# Patient Record
Sex: FEMALE | Race: WHITE | NOT HISPANIC OR LATINO | Employment: UNEMPLOYED | URBAN - METROPOLITAN AREA
[De-identification: names, ages, dates, MRNs, and addresses within clinical notes are randomized per-mention and may not be internally consistent; named-entity substitution may affect disease eponyms.]

---

## 2018-01-27 LAB
COLOR STL: ABNORMAL
CONSISTENCY STL: ABNORMAL
HEMOCCULT SP1 STL QL: POSITIVE

## 2018-01-28 LAB
COLOR STL: ABNORMAL
CONSISTENCY STL: ABNORMAL
HEMOCCULT SP2 STL QL: POSITIVE

## 2018-01-30 ENCOUNTER — HISTORICAL (OUTPATIENT)
Dept: INTERNAL MEDICINE | Facility: CLINIC | Age: 58
End: 2018-01-30

## 2018-01-30 LAB
COLOR STL: NORMAL
CONSISTENCY STL: NORMAL

## 2018-02-01 ENCOUNTER — HISTORICAL (OUTPATIENT)
Dept: INTERNAL MEDICINE | Facility: CLINIC | Age: 58
End: 2018-02-01

## 2018-02-01 LAB
ABS NEUT (OLG): 3.72 X10(3)/MCL (ref 2.1–9.2)
ALBUMIN SERPL-MCNC: 4.1 GM/DL (ref 3.4–5)
ALBUMIN/GLOB SERPL: 1 RATIO (ref 1–2)
ALP SERPL-CCNC: 92 UNIT/L (ref 45–117)
ALT SERPL-CCNC: 19 UNIT/L (ref 12–78)
APPEARANCE, UA: ABNORMAL
AST SERPL-CCNC: 12 UNIT/L (ref 15–37)
BACTERIA #/AREA URNS AUTO: ABNORMAL /[HPF]
BASOPHILS # BLD AUTO: 0.08 X10(3)/MCL
BASOPHILS NFR BLD AUTO: 1 % (ref 0–1)
BILIRUB SERPL-MCNC: 0.9 MG/DL (ref 0.2–1)
BILIRUB UR QL STRIP: NEGATIVE
BILIRUBIN DIRECT+TOT PNL SERPL-MCNC: 0.2 MG/DL
BILIRUBIN DIRECT+TOT PNL SERPL-MCNC: 0.7 MG/DL
BUN SERPL-MCNC: 16 MG/DL (ref 7–18)
CALCIUM SERPL-MCNC: 9.3 MG/DL (ref 8.5–10.1)
CHLORIDE SERPL-SCNC: 107 MMOL/L (ref 98–107)
CHOLEST SERPL-MCNC: 151 MG/DL
CHOLEST/HDLC SERPL: 2.7 {RATIO} (ref 0–4.4)
CO2 SERPL-SCNC: 25 MMOL/L (ref 21–32)
COLOR UR: YELLOW
CREAT SERPL-MCNC: 0.8 MG/DL (ref 0.6–1.3)
DEPRECATED CALCIDIOL+CALCIFEROL SERPL-MC: 18.67 NG/ML (ref 30–80)
EOSINOPHIL # BLD AUTO: 0.12 10*3/UL
EOSINOPHIL NFR BLD AUTO: 2 % (ref 0–5)
ERYTHROCYTE [DISTWIDTH] IN BLOOD BY AUTOMATED COUNT: 12.4 % (ref 11.5–14.5)
EST. AVERAGE GLUCOSE BLD GHB EST-MCNC: 103 MG/DL
GLOBULIN SER-MCNC: 3.6 GM/ML (ref 2.3–3.5)
GLUCOSE (UA): NORMAL
GLUCOSE SERPL-MCNC: 89 MG/DL (ref 74–106)
HAV IGM SERPL QL IA: NONREACTIVE
HBA1C MFR BLD: 5.2 % (ref 4.2–6.3)
HBV CORE IGM SERPL QL IA: NONREACTIVE
HBV SURFACE AG SERPL QL IA: NEGATIVE
HCT VFR BLD AUTO: 45.4 % (ref 35–46)
HCV AB SERPL QL IA: REACTIVE
HDLC SERPL-MCNC: 55 MG/DL
HGB BLD-MCNC: 16.3 GM/DL (ref 12–16)
HGB UR QL STRIP: 0.06 MG/DL
HIV 1+2 AB+HIV1 P24 AG SERPL QL IA: NONREACTIVE
HYALINE CASTS #/AREA URNS LPF: ABNORMAL /[LPF]
IMM GRANULOCYTES # BLD AUTO: 0.01 10*3/UL
IMM GRANULOCYTES NFR BLD AUTO: 0 %
KETONES UR QL STRIP: NEGATIVE
LDLC SERPL CALC-MCNC: 74 MG/DL (ref 0–130)
LEUKOCYTE ESTERASE UR QL STRIP: 250 LEU/UL
LYMPHOCYTES # BLD AUTO: 1.79 X10(3)/MCL
LYMPHOCYTES NFR BLD AUTO: 29 % (ref 15–40)
MCH RBC QN AUTO: 32.3 PG (ref 26–34)
MCHC RBC AUTO-ENTMCNC: 35.9 GM/DL (ref 31–37)
MCV RBC AUTO: 89.9 FL (ref 80–100)
MONOCYTES # BLD AUTO: 0.49 X10(3)/MCL
MONOCYTES NFR BLD AUTO: 8 % (ref 4–12)
NEUTROPHILS # BLD AUTO: 3.72 X10(3)/MCL
NEUTROPHILS NFR BLD AUTO: 60 X10(3)/MCL
NITRITE UR QL STRIP: NEGATIVE
PH UR STRIP: 5.5 [PH] (ref 4.5–8)
PLATELET # BLD AUTO: 184 X10(3)/MCL (ref 130–400)
PMV BLD AUTO: 12.6 FL (ref 7.4–10.4)
POTASSIUM SERPL-SCNC: 3.9 MMOL/L (ref 3.5–5.1)
PROT SERPL-MCNC: 7.7 GM/DL (ref 6.4–8.2)
PROT UR QL STRIP: 20 MG/DL
RBC # BLD AUTO: 5.05 X10(6)/MCL (ref 4–5.2)
RBC #/AREA URNS AUTO: ABNORMAL /[HPF]
SODIUM SERPL-SCNC: 141 MMOL/L (ref 136–145)
SP GR UR STRIP: 1.02 (ref 1–1.03)
SQUAMOUS #/AREA URNS LPF: >100 /[LPF]
TRIGL SERPL-MCNC: 112 MG/DL
TSH SERPL-ACNC: 1.5 MIU/L (ref 0.36–3.74)
UROBILINOGEN UR STRIP-ACNC: NORMAL
VLDLC SERPL CALC-MCNC: 22 MG/DL
WBC # SPEC AUTO: 6.2 X10(3)/MCL (ref 4.5–11)
WBC #/AREA URNS AUTO: ABNORMAL /HPF

## 2018-03-23 ENCOUNTER — HISTORICAL (OUTPATIENT)
Dept: ENDOSCOPY | Facility: HOSPITAL | Age: 58
End: 2018-03-23

## 2018-03-23 LAB — CRC RECOMMENDATION EXT: NORMAL

## 2018-04-27 ENCOUNTER — HISTORICAL (OUTPATIENT)
Dept: INTERNAL MEDICINE | Facility: CLINIC | Age: 58
End: 2018-04-27

## 2018-04-27 LAB
ABS NEUT (OLG): 4.23 X10(3)/MCL (ref 2.1–9.2)
ALBUMIN SERPL-MCNC: 4.2 GM/DL (ref 3.4–5)
ALBUMIN/GLOB SERPL: 1 RATIO (ref 1–2)
ALP SERPL-CCNC: 98 UNIT/L (ref 45–117)
ALT SERPL-CCNC: 20 UNIT/L (ref 12–78)
APPEARANCE, UA: CLEAR
AST SERPL-CCNC: 16 UNIT/L (ref 15–37)
BACTERIA #/AREA URNS AUTO: ABNORMAL /[HPF]
BASOPHILS # BLD AUTO: 0.08 X10(3)/MCL
BASOPHILS NFR BLD AUTO: 1 %
BILIRUB SERPL-MCNC: 0.5 MG/DL (ref 0.2–1)
BILIRUB UR QL STRIP: NEGATIVE
BILIRUBIN DIRECT+TOT PNL SERPL-MCNC: 0.2 MG/DL
BILIRUBIN DIRECT+TOT PNL SERPL-MCNC: 0.3 MG/DL
BUN SERPL-MCNC: 18 MG/DL (ref 7–18)
CALCIUM SERPL-MCNC: 9.1 MG/DL (ref 8.5–10.1)
CHLORIDE SERPL-SCNC: 108 MMOL/L (ref 98–107)
CO2 SERPL-SCNC: 28 MMOL/L (ref 21–32)
COLOR UR: ABNORMAL
CREAT SERPL-MCNC: 0.8 MG/DL (ref 0.6–1.3)
DEPRECATED CALCIDIOL+CALCIFEROL SERPL-MC: 52.83 NG/ML (ref 30–80)
EOSINOPHIL # BLD AUTO: 0.19 X10(3)/MCL
EOSINOPHIL NFR BLD AUTO: 3 %
ERYTHROCYTE [DISTWIDTH] IN BLOOD BY AUTOMATED COUNT: 12.2 % (ref 11.5–14.5)
GLOBULIN SER-MCNC: 3.5 GM/ML (ref 2.3–3.5)
GLUCOSE (UA): NORMAL
GLUCOSE SERPL-MCNC: 98 MG/DL (ref 74–106)
HCT VFR BLD AUTO: 44 % (ref 35–46)
HGB BLD-MCNC: 15.1 GM/DL (ref 12–16)
HGB UR QL STRIP: NEGATIVE
HYALINE CASTS #/AREA URNS LPF: ABNORMAL /[LPF]
IMM GRANULOCYTES # BLD AUTO: 0.02 10*3/UL
IMM GRANULOCYTES NFR BLD AUTO: 0 %
KETONES UR QL STRIP: NEGATIVE
LEUKOCYTE ESTERASE UR QL STRIP: 75 LEU/UL
LYMPHOCYTES # BLD AUTO: 1.78 X10(3)/MCL
LYMPHOCYTES NFR BLD AUTO: 26 % (ref 13–40)
MCH RBC QN AUTO: 31.3 PG (ref 26–34)
MCHC RBC AUTO-ENTMCNC: 34.3 GM/DL (ref 31–37)
MCV RBC AUTO: 91.3 FL (ref 80–100)
MONOCYTES # BLD AUTO: 0.49 X10(3)/MCL
MONOCYTES NFR BLD AUTO: 7 % (ref 4–12)
NEUTROPHILS # BLD AUTO: 4.23 X10(3)/MCL
NEUTROPHILS NFR BLD AUTO: 62 X10(3)/MCL
NITRITE UR QL STRIP: NEGATIVE
PH UR STRIP: 7 [PH] (ref 4.5–8)
PLATELET # BLD AUTO: 153 X10(3)/MCL (ref 130–400)
PMV BLD AUTO: 11.6 FL (ref 7.4–10.4)
POTASSIUM SERPL-SCNC: 4.4 MMOL/L (ref 3.5–5.1)
PROT SERPL-MCNC: 7.7 GM/DL (ref 6.4–8.2)
PROT UR QL STRIP: NEGATIVE
RBC # BLD AUTO: 4.82 X10(6)/MCL (ref 4–5.2)
RBC #/AREA URNS AUTO: ABNORMAL /[HPF]
SODIUM SERPL-SCNC: 143 MMOL/L (ref 136–145)
SP GR UR STRIP: 1.01 (ref 1–1.03)
SQUAMOUS #/AREA URNS LPF: >100 /[LPF]
UROBILINOGEN UR STRIP-ACNC: NORMAL
WBC # SPEC AUTO: 6.8 X10(3)/MCL (ref 4.5–11)
WBC #/AREA URNS AUTO: ABNORMAL /HPF

## 2019-11-19 ENCOUNTER — HISTORICAL (OUTPATIENT)
Dept: RADIOLOGY | Facility: HOSPITAL | Age: 59
End: 2019-11-19

## 2020-08-06 ENCOUNTER — HISTORICAL (OUTPATIENT)
Dept: LAB | Facility: HOSPITAL | Age: 60
End: 2020-08-06

## 2020-08-06 LAB
ABS NEUT (OLG): 3.71 X10(3)/MCL (ref 2.1–9.2)
ALBUMIN SERPL-MCNC: 4 GM/DL (ref 3.4–5)
ALBUMIN/GLOB SERPL: 1.1 RATIO (ref 1.1–2)
ALP SERPL-CCNC: 93 UNIT/L (ref 45–117)
ALT SERPL-CCNC: 23 UNIT/L (ref 12–78)
APPEARANCE, UA: CLEAR
AST SERPL-CCNC: 14 UNIT/L (ref 15–37)
BACTERIA #/AREA URNS AUTO: ABNORMAL /HPF
BASOPHILS # BLD AUTO: 0.1 X10(3)/MCL (ref 0–0.2)
BASOPHILS NFR BLD AUTO: 1 %
BILIRUB SERPL-MCNC: 0.3 MG/DL (ref 0.2–1)
BILIRUB UR QL STRIP: NEGATIVE
BILIRUBIN DIRECT+TOT PNL SERPL-MCNC: 0.1 MG/DL (ref 0–0.2)
BILIRUBIN DIRECT+TOT PNL SERPL-MCNC: 0.2 MG/DL
BUN SERPL-MCNC: 18 MG/DL (ref 7–18)
CALCIUM SERPL-MCNC: 9 MG/DL (ref 8.5–10.1)
CHLORIDE SERPL-SCNC: 110 MMOL/L (ref 98–107)
CHOLEST SERPL-MCNC: 168 MG/DL
CHOLEST/HDLC SERPL: 3.8 {RATIO} (ref 0–4.4)
CO2 SERPL-SCNC: 26 MMOL/L (ref 21–32)
COLOR UR: YELLOW
CREAT SERPL-MCNC: 0.7 MG/DL (ref 0.6–1.3)
DEPRECATED CALCIDIOL+CALCIFEROL SERPL-MC: 31.9 NG/ML (ref 30–80)
EOSINOPHIL # BLD AUTO: 0.3 X10(3)/MCL (ref 0–0.9)
EOSINOPHIL NFR BLD AUTO: 5 %
ERYTHROCYTE [DISTWIDTH] IN BLOOD BY AUTOMATED COUNT: 13.5 % (ref 11.5–14.5)
EST. AVERAGE GLUCOSE BLD GHB EST-MCNC: 117 MG/DL
GLOBULIN SER-MCNC: 3.5 GM/ML (ref 2.3–3.5)
GLUCOSE (UA): NEGATIVE
GLUCOSE SERPL-MCNC: 70 MG/DL (ref 74–106)
HBA1C MFR BLD: 5.7 % (ref 4.2–6.3)
HCT VFR BLD AUTO: 41 % (ref 35–46)
HDLC SERPL-MCNC: 44 MG/DL (ref 40–59)
HGB BLD-MCNC: 13.7 GM/DL (ref 12–16)
HGB UR QL STRIP: 0.03 MG/DL
HYALINE CASTS #/AREA URNS LPF: ABNORMAL /LPF
IMM GRANULOCYTES # BLD AUTO: 0.02 10*3/UL
IMM GRANULOCYTES NFR BLD AUTO: 0 %
KETONES UR QL STRIP: NEGATIVE
LDLC SERPL CALC-MCNC: 88 MG/DL
LEUKOCYTE ESTERASE UR QL STRIP: 25 LEU/UL
LYMPHOCYTES # BLD AUTO: 1.8 X10(3)/MCL (ref 0.6–4.6)
LYMPHOCYTES NFR BLD AUTO: 28 %
MCH RBC QN AUTO: 31.1 PG (ref 26–34)
MCHC RBC AUTO-ENTMCNC: 33.4 GM/DL (ref 31–37)
MCV RBC AUTO: 93.2 FL (ref 80–100)
MONOCYTES # BLD AUTO: 0.6 X10(3)/MCL (ref 0.1–1.3)
MONOCYTES NFR BLD AUTO: 10 %
NEUTROPHILS # BLD AUTO: 3.71 X10(3)/MCL (ref 2.1–9.2)
NEUTROPHILS NFR BLD AUTO: 56 %
NITRITE UR QL STRIP: NEGATIVE
PH UR STRIP: 5 [PH] (ref 4.5–8)
PLATELET # BLD AUTO: 180 X10(3)/MCL (ref 130–400)
PMV BLD AUTO: 11.4 FL (ref 7.4–10.4)
POTASSIUM SERPL-SCNC: 4 MMOL/L (ref 3.5–5.1)
PROT SERPL-MCNC: 7.5 GM/DL (ref 6.4–8.2)
PROT UR QL STRIP: NEGATIVE
RBC # BLD AUTO: 4.4 X10(6)/MCL (ref 4–5.2)
RBC #/AREA URNS AUTO: ABNORMAL /HPF
SODIUM SERPL-SCNC: 143 MMOL/L (ref 136–145)
SP GR UR STRIP: 1.02 (ref 1–1.03)
SQUAMOUS #/AREA URNS LPF: ABNORMAL /LPF
T4 FREE SERPL-MCNC: 1.01 NG/DL (ref 0.76–1.46)
TRIGL SERPL-MCNC: 178 MG/DL
TSH SERPL-ACNC: 1.45 MIU/L (ref 0.36–3.74)
UROBILINOGEN UR STRIP-ACNC: NORMAL
VLDLC SERPL CALC-MCNC: 36 MG/DL
WBC # SPEC AUTO: 6.6 X10(3)/MCL (ref 4.5–11)
WBC #/AREA URNS AUTO: ABNORMAL /HPF

## 2022-04-12 ENCOUNTER — HISTORICAL (OUTPATIENT)
Dept: ADMINISTRATIVE | Facility: HOSPITAL | Age: 62
End: 2022-04-12
Payer: MEDICAID

## 2022-04-30 VITALS
DIASTOLIC BLOOD PRESSURE: 86 MMHG | OXYGEN SATURATION: 100 % | HEIGHT: 66 IN | SYSTOLIC BLOOD PRESSURE: 136 MMHG | WEIGHT: 146.63 LBS | BODY MASS INDEX: 23.57 KG/M2

## 2022-04-30 NOTE — OP NOTE
* Preliminary Report *    UHDS (Unverified)  DISCHARGE DATE:      PREOPERATIVE DIAGNOSIS:  Screening colonoscopy.    POSTOPERATIVE DIAGNOSIS:  Screening colonoscopy.    PROCEDURE:  Colonoscopy.    SURGEON:  Dr. Dimitrios Medrano.    COMPLICATIONS:  None.    FINDINGS:  Negative colonoscopy for polyps. Very mild sigmoid diverticulosis.    RECOMMENDATIONS:  Re-scope in 10 years or sooner if other symptoms develop.    DETAILS OF THE PROCEDURE:  After the risks and benefits of the procedure explained to the patient, informed consent was obtained. She was taken to endoscopy on 03/23/2018.  She was placed on the endoscopy table in the left lateral decubitus position. Over the course of the procedure, she was administered propofol by Anesthesiology.  I performed a rectal examination which was negative.  I then inserted the colonoscope all the way to the base of the cecum as evidenced by the appendiceal orifice and the ileocecal valve.  I centered the scope and slowly began to retract it, looking around in a 360 degree fashion.  There were no mucosal abnormalities.  No masses.  No polyps.  No AVM's.  No evidence of inflammatory bowel disease.  In the sigmoid colon, she had a couple of diverticula.  No evidence of diverticulitis.  I retroflexed in the rectum.  She does not have significant hemorrhoidal disease to speak of.  The scope was retracted.  She tolerated the procedure well, was taken to the recovery room in stable condition.    RECOMMENDATIONS:  Re-scope in 10 years or sooner if other symptoms develop.        ______________________________  MD KATHYA Shah/CHANEL  DD:  03/23/2018  Time:  09:00AM  DT:  03/23/2018  Time:  09:16AM  Job #:  [id number]       Result type: Discharge Summary  Result date: March 23, 2018 9:16 CDT  Result status: Unauth  Result title: UHDS  Performed by: Marcela CAMILO, Dimitrios CORONEL on March 23, 2018 9:00 CDT  Encounter info: [id number], [facility], Day Surgery, 3/23/2018  - 3/23/2018  Contributor system: KASIE    Doc has been moved by HIM specialist to the correct doc type.

## 2022-05-30 RX ORDER — AMLODIPINE BESYLATE 5 MG/1
5 TABLET ORAL DAILY
Qty: 30 TABLET | Refills: 1 | Status: CANCELLED | OUTPATIENT
Start: 2022-05-30

## 2022-05-30 RX ORDER — AMLODIPINE BESYLATE 5 MG/1
5 TABLET ORAL DAILY
COMMUNITY
Start: 2022-04-25 | End: 2022-06-10 | Stop reason: SDUPTHER

## 2022-05-30 NOTE — TELEPHONE ENCOUNTER
----- Message from Elena Masters sent at 5/30/2022 11:05 AM CDT -----  Regarding: Refills  Provider: DR GUZMAN  [facility]: [address]  Last Visit:  Next Visit: 6/10/2022  Patient's Contact Number:    1. Name of Medication: Amlodipine Besylate  Dosage: 5 mg tablet  Comments:    2. Name of Medication:  Dosage:  Comments:    3. Name of Medication:  Dosage:  Comments    4. Name of Medication:  Dosage:  Comments:    5. Name of Medication:  Dosage:  Comments:

## 2022-06-10 ENCOUNTER — OFFICE VISIT (OUTPATIENT)
Dept: FAMILY MEDICINE | Facility: CLINIC | Age: 62
End: 2022-06-10
Payer: MEDICAID

## 2022-06-10 VITALS
RESPIRATION RATE: 18 BRPM | OXYGEN SATURATION: 100 % | DIASTOLIC BLOOD PRESSURE: 69 MMHG | SYSTOLIC BLOOD PRESSURE: 113 MMHG | WEIGHT: 153 LBS | HEIGHT: 65 IN | BODY MASS INDEX: 25.49 KG/M2 | HEART RATE: 62 BPM | TEMPERATURE: 98 F

## 2022-06-10 DIAGNOSIS — K21.00 GASTROESOPHAGEAL REFLUX DISEASE WITH ESOPHAGITIS WITHOUT HEMORRHAGE: ICD-10-CM

## 2022-06-10 DIAGNOSIS — M25.50 ARTHRALGIA, UNSPECIFIED JOINT: ICD-10-CM

## 2022-06-10 DIAGNOSIS — R63.1 INCREASED THIRST: ICD-10-CM

## 2022-06-10 DIAGNOSIS — F33.1 MODERATE EPISODE OF RECURRENT MAJOR DEPRESSIVE DISORDER: ICD-10-CM

## 2022-06-10 DIAGNOSIS — I87.2 VENOUS INSUFFICIENCY OF BOTH LOWER EXTREMITIES: ICD-10-CM

## 2022-06-10 DIAGNOSIS — M54.16 LUMBAR RADICULOPATHY: ICD-10-CM

## 2022-06-10 DIAGNOSIS — I10 PRIMARY HYPERTENSION: Primary | ICD-10-CM

## 2022-06-10 PROBLEM — M54.30 SCIATICA: Status: RESOLVED | Noted: 2022-06-10 | Resolved: 2022-06-10

## 2022-06-10 PROBLEM — M25.562 LEFT KNEE PAIN: Status: ACTIVE | Noted: 2022-06-10

## 2022-06-10 PROBLEM — K21.9 GASTROESOPHAGEAL REFLUX DISEASE: Status: ACTIVE | Noted: 2022-06-10

## 2022-06-10 PROBLEM — E55.9 VITAMIN D DEFICIENCY: Status: ACTIVE | Noted: 2022-06-10

## 2022-06-10 PROBLEM — B02.9 HERPES ZOSTER: Status: ACTIVE | Noted: 2021-11-19

## 2022-06-10 PROBLEM — F33.3 SEVERE RECURRENT MAJOR DEPRESSIVE DISORDER WITH PSYCHOSIS: Status: ACTIVE | Noted: 2022-06-10

## 2022-06-10 PROBLEM — F41.8 MIXED ANXIETY DEPRESSIVE DISORDER: Status: ACTIVE | Noted: 2022-06-10

## 2022-06-10 PROBLEM — M54.30 SCIATICA: Status: ACTIVE | Noted: 2022-06-10

## 2022-06-10 PROBLEM — M23.91 UNSPECIFIED INTERNAL DERANGEMENT OF RIGHT KNEE: Status: ACTIVE | Noted: 2021-11-19

## 2022-06-10 PROBLEM — I49.9 IRREGULAR HEART RHYTHM: Status: ACTIVE | Noted: 2022-06-10

## 2022-06-10 PROBLEM — N39.0 URINARY TRACT INFECTION: Status: ACTIVE | Noted: 2021-11-19

## 2022-06-10 PROBLEM — Z86.79 HISTORY OF WOLFF-PARKINSON-WHITE (WPW) SYNDROME: Status: ACTIVE | Noted: 2022-06-10

## 2022-06-10 PROBLEM — L03.116 CELLULITIS OF LEFT LOWER EXTREMITY: Status: ACTIVE | Noted: 2021-11-19

## 2022-06-10 PROBLEM — M51.36 DEGENERATIVE LUMBAR DISC: Status: ACTIVE | Noted: 2022-06-10

## 2022-06-10 PROBLEM — M51.369 DEGENERATIVE LUMBAR DISC: Status: ACTIVE | Noted: 2022-06-10

## 2022-06-10 PROBLEM — S83.241A OTHER TEAR OF MEDIAL MENISCUS, CURRENT INJURY, RIGHT KNEE, INITIAL ENCOUNTER: Status: ACTIVE | Noted: 2022-06-10

## 2022-06-10 PROCEDURE — 3074F SYST BP LT 130 MM HG: CPT | Mod: CPTII,,, | Performed by: FAMILY MEDICINE

## 2022-06-10 PROCEDURE — 99214 PR OFFICE/OUTPT VISIT, EST, LEVL IV, 30-39 MIN: ICD-10-PCS | Mod: S$PBB,,, | Performed by: FAMILY MEDICINE

## 2022-06-10 PROCEDURE — 3008F PR BODY MASS INDEX (BMI) DOCUMENTED: ICD-10-PCS | Mod: CPTII,,, | Performed by: FAMILY MEDICINE

## 2022-06-10 PROCEDURE — 3074F PR MOST RECENT SYSTOLIC BLOOD PRESSURE < 130 MM HG: ICD-10-PCS | Mod: CPTII,,, | Performed by: FAMILY MEDICINE

## 2022-06-10 PROCEDURE — 99214 OFFICE O/P EST MOD 30 MIN: CPT | Mod: S$PBB,,, | Performed by: FAMILY MEDICINE

## 2022-06-10 PROCEDURE — 3078F PR MOST RECENT DIASTOLIC BLOOD PRESSURE < 80 MM HG: ICD-10-PCS | Mod: CPTII,,, | Performed by: FAMILY MEDICINE

## 2022-06-10 PROCEDURE — 3078F DIAST BP <80 MM HG: CPT | Mod: CPTII,,, | Performed by: FAMILY MEDICINE

## 2022-06-10 PROCEDURE — 1159F MED LIST DOCD IN RCRD: CPT | Mod: CPTII,,, | Performed by: FAMILY MEDICINE

## 2022-06-10 PROCEDURE — 3008F BODY MASS INDEX DOCD: CPT | Mod: CPTII,,, | Performed by: FAMILY MEDICINE

## 2022-06-10 PROCEDURE — 1160F RVW MEDS BY RX/DR IN RCRD: CPT | Mod: CPTII,,, | Performed by: FAMILY MEDICINE

## 2022-06-10 PROCEDURE — 99215 OFFICE O/P EST HI 40 MIN: CPT | Mod: PBBFAC | Performed by: FAMILY MEDICINE

## 2022-06-10 PROCEDURE — 1160F PR REVIEW ALL MEDS BY PRESCRIBER/CLIN PHARMACIST DOCUMENTED: ICD-10-PCS | Mod: CPTII,,, | Performed by: FAMILY MEDICINE

## 2022-06-10 PROCEDURE — 1159F PR MEDICATION LIST DOCUMENTED IN MEDICAL RECORD: ICD-10-PCS | Mod: CPTII,,, | Performed by: FAMILY MEDICINE

## 2022-06-10 RX ORDER — MELOXICAM 15 MG/1
1 TABLET ORAL DAILY
COMMUNITY
Start: 2021-11-19 | End: 2022-06-10 | Stop reason: SDUPTHER

## 2022-06-10 RX ORDER — OMEPRAZOLE 40 MG/1
40 CAPSULE, DELAYED RELEASE ORAL DAILY
Qty: 30 CAPSULE | Refills: 5 | Status: SHIPPED | OUTPATIENT
Start: 2022-06-10 | End: 2023-02-10

## 2022-06-10 RX ORDER — AMLODIPINE BESYLATE 5 MG/1
5 TABLET ORAL DAILY
Qty: 30 TABLET | Refills: 5 | Status: SHIPPED | OUTPATIENT
Start: 2022-06-10 | End: 2023-02-10 | Stop reason: SDUPTHER

## 2022-06-10 RX ORDER — ERGOCALCIFEROL 1.25 MG/1
50000 CAPSULE ORAL
COMMUNITY
Start: 2022-04-22 | End: 2022-06-10 | Stop reason: SDUPTHER

## 2022-06-10 RX ORDER — ESOMEPRAZOLE MAGNESIUM 40 MG/1
1 CAPSULE, DELAYED RELEASE ORAL DAILY
COMMUNITY
End: 2022-06-10

## 2022-06-10 RX ORDER — DULOXETIN HYDROCHLORIDE 30 MG/1
30 CAPSULE, DELAYED RELEASE ORAL DAILY
Qty: 30 CAPSULE | Refills: 1 | Status: SHIPPED | OUTPATIENT
Start: 2022-06-10 | End: 2023-02-10

## 2022-06-10 RX ORDER — OMEPRAZOLE 40 MG/1
40 CAPSULE, DELAYED RELEASE ORAL DAILY
COMMUNITY
Start: 2022-03-24 | End: 2022-06-10 | Stop reason: SDUPTHER

## 2022-06-10 RX ORDER — MELOXICAM 15 MG/1
15 TABLET ORAL DAILY
Qty: 30 TABLET | Refills: 1 | Status: SHIPPED | OUTPATIENT
Start: 2022-06-10 | End: 2022-07-10

## 2022-06-10 RX ORDER — AMLODIPINE BESYLATE 5 MG/1
5 TABLET ORAL DAILY
COMMUNITY
Start: 2021-08-17 | End: 2022-06-10 | Stop reason: SDUPTHER

## 2022-06-10 RX ORDER — ERGOCALCIFEROL 1.25 MG/1
50000 CAPSULE ORAL
Qty: 12 CAPSULE | Refills: 0 | Status: SHIPPED | OUTPATIENT
Start: 2022-06-10 | End: 2022-09-08

## 2022-06-10 NOTE — PROGRESS NOTES
[facility] - [facility]  [address] [address]  Phone: [phone]   Subjective:      Patient ID: Ching Rojas is a 61 y.o. female with a past medical history of hypertension, GERD, vitamin D deficiency, Wolfman Parkinson White syndrome/irregular heartbeat/palpitations, lumbar DDD with radiculopathy, major depression, and anxiety. FHx of BP disorder.  Patient states that she is adhering to a low sodium and low fat eating habit.     Pt lost to follow up since 8/12/2020 (last visit).    Chief Complaint: Leg Pain (Ankles and feet GEORGE ) and Hypertension    Problem List Items Addressed This Visit        Cardiac/Vascular    Venous insufficiency of both lower extremities    Overview     Pt with venous insufficiency of b/l legs with engorged vessels in the legs L>R. Pt c/o peripheral edema and pain in b/l legs from swelling. Pt states she has seen [facility] in the past for sclerotherapy.           Relevant Orders    CV US Lower Extremity Veins Bilateral Insufficiency    Ambulatory referral/consult to Interventional Cardiology    Hypertension - Primary    Overview     The patient presents with essential hypertension.  The patient is tolerating the medication well and is in excellent compliance.  The patient is experiencing no side effects.  Counseling was offered regarding low salt diets.  The patient has a reduced salt intake.  The patient denies chest pain, palpitations, shortness of breath, dyspnea on exertion, left or murmur neck pain, nausea, vomiting, diaphoresis, paroxysmal nocturnal dyspnea, and orthopnea.   Hypertension Medications             amLODIPine (NORVASC) 5 MG tablet Take 5 mg by mouth once daily.            Relevant Orders    CBC Auto Differential    Comprehensive Metabolic Panel    TSH    Lipid Panel       GI    Gastroesophageal reflux disease      Other Visit Diagnoses     Increased thirst        Relevant Orders    Hemoglobin A1C    Arthralgia,  unspecified joint      Localized to the ankles and knees. Pt states she has a history of gout. Pt states joints are tender with swelling intermittently. Pt denies trauma.    Relevant Orders    Vitamin D    Uric Acid    ANTINUCLEAR ANTIBODIES COMPREHENSIVE PANEL          The patient's Health Maintenance was reviewed and the following appears to be due:   Health Maintenance Due   Topic Date Due    Hepatitis C Screening  Never done    Cervical Cancer Screening  Never done    COVID-19 Vaccine (1) Never done    Colorectal Cancer Screening  Never done    Shingles Vaccine (1 of 2) Never done    Mammogram  03/06/2016       (PHQ-2 data if performed)  Depression Patient Health Questionnaire 6/10/2022   Over the last two weeks how often have you been bothered by little interest or pleasure in doing things 0   Over the last two weeks how often have you been bothered by feeling down, depressed or hopeless 0   PHQ-2 Total Score 0     (PHQ-9 data if performed)  No flowsheet data found.  (JENNIFER data if performed)  GAD7 6/10/2022   1. Feeling nervous, anxious, or on edge? 1   2. Not being able to stop or control worrying? 3   3. Worrying too much about different things? 3   4. Trouble relaxing? 1   5. Being so restless that it is hard to sit still? 2   6. Becoming easily annoyed or irritable? 2   7. Feeling afraid as if something awful might happen? 2   8. If you checked off any problems, how difficult have these problems made it for you to do your work, take care of things at home, or get along with other people? 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      [DISCONTINUED] amLODIPine (NORVASC) 5 MG tablet Take 5 mg by mouth once daily.      [DISCONTINUED] esomeprazole (NEXIUM) 40 MG capsule Take 1 capsule by mouth once daily.      [DISCONTINUED] interferon trina-2b,recomb. (INTERFERON TRINA-2B INJ) interferon trina-2b Take No date recorded No form recorded No frequency recorded No route recorded No set duration recorded No set duration amount recorded suspended No dosage strength recorded No dosage strength units of measure recorded      [DISCONTINUED] meloxicam (MOBIC) 15 MG tablet Take 1 tablet by mouth once daily.      [DISCONTINUED] omeprazole (PRILOSEC) 40 MG capsule Take 40 mg by mouth once daily.      [DISCONTINUED] VITAMIN D2 1,250 mcg (50,000 unit) capsule Take 50,000 Units by mouth every 7 days.       No current facility-administered medications on file prior to visit.     Social History     Socioeconomic History    Marital status:    Tobacco Use    Smoking status: Former Smoker    Smokeless tobacco: Never Used   Substance and Sexual Activity    Alcohol use: Yes    Drug use: Never    Sexual activity: Not Currently     Family History   Problem Relation Age of Onset    Hypertension Mother     Diabetes Mother     Epilepsy Father     Diabetes Sister     Anemia Brother        Review of Systems   Constitutional: Negative for chills, fatigue and fever.   HENT: Negative for congestion, hearing loss, rhinorrhea, sore throat and voice change.    Eyes: Negative for visual disturbance.  "  Respiratory: Negative for cough, shortness of breath and wheezing.    Cardiovascular: Positive for leg swelling (b/l venous engorgement of legs L>R). Negative for chest pain and palpitations.   Gastrointestinal: Negative for abdominal pain, blood in stool, constipation, diarrhea, nausea and vomiting.   Endocrine: Negative for cold intolerance, heat intolerance, polydipsia, polyphagia and polyuria.   Genitourinary: Negative for decreased urine volume, difficulty urinating, flank pain, frequency, hematuria and urgency.   Musculoskeletal: Positive for arthralgias and joint swelling (ankles and knees). Negative for gait problem and myalgias.   Skin: Negative for rash.   Neurological: Negative for dizziness, seizures, speech difficulty, weakness, numbness and headaches.   Hematological: Negative for adenopathy.   Psychiatric/Behavioral: Negative.  Negative for decreased concentration, dysphoric mood, hallucinations and suicidal ideas. The patient is not nervous/anxious.    All other systems reviewed and are negative.      Objective:   /69 (BP Location: Left arm, Patient Position: Sitting)   Pulse 62   Temp 97.5 °F (36.4 °C) (Oral)   Resp 18   Ht 5' 5" (1.651 m)   Wt 69.4 kg (153 lb)   SpO2 100%   BMI 25.46 kg/m²   Physical Exam  Vitals and nursing note reviewed.   Constitutional:       General: She is not in acute distress.     Appearance: Normal appearance. She is normal weight. She is not ill-appearing.   HENT:      Head: Normocephalic and atraumatic.      Nose: Nose normal.      Mouth/Throat:      Mouth: Mucous membranes are moist.   Eyes:      Extraocular Movements: Extraocular movements intact.      Conjunctiva/sclera: Conjunctivae normal.      Pupils: Pupils are equal, round, and reactive to light.   Cardiovascular:      Rate and Rhythm: Normal rate and regular rhythm.      Pulses: Normal pulses.      Heart sounds: Normal heart sounds. No murmur heard.    No friction rub. No gallop.   Pulmonary:      " Effort: Pulmonary effort is normal. No respiratory distress.      Breath sounds: Normal breath sounds. No wheezing, rhonchi or rales.   Abdominal:      General: Abdomen is flat. Bowel sounds are normal.      Palpations: Abdomen is soft. There is no mass.      Tenderness: There is no abdominal tenderness. There is no guarding or rebound.      Hernia: No hernia is present.   Musculoskeletal:         General: Swelling present. Normal range of motion.      Cervical back: Normal range of motion and neck supple.      Right lower leg: Edema present.      Left lower leg: Edema present.      Comments: b/l venous engorgement of legs L>R; with broken smaller vessels in ankle and top of foot   Skin:     General: Skin is warm and dry.      Capillary Refill: Capillary refill takes less than 2 seconds.      Findings: No lesion or rash.   Neurological:      General: No focal deficit present.      Mental Status: She is alert and oriented to person, place, and time. Mental status is at baseline.      Cranial Nerves: No cranial nerve deficit.      Sensory: No sensory deficit.      Gait: Gait normal.   Psychiatric:         Mood and Affect: Mood normal.         Behavior: Behavior normal.         Thought Content: Thought content normal.         Judgment: Judgment normal.          No visits with results within 1 Month(s) from this visit.   Latest known visit with results is:   Historical on 08/06/2020   Component Date Value Ref Range Status    Albumin/Globulin Ratio 08/06/2020 1.1  1.1 - 2.0 ratio Final    Alanine Aminotransferase 08/06/2020 23  12 - 78 unit/L Final    Albumin Level 08/06/2020 4.0  3.4 - 5.0 gm/dL Final    Alkaline Phosphatase 08/06/2020 93  45 - 117 unit/L Final    Aspartate Aminotransferase 08/06/2020 14 (A) 15 - 37 unit/L Final    Blood Urea Nitrogen 08/06/2020 18  7 - 18 mg/dL Final    Bilirubin Total 08/06/2020 0.3  0.2 - 1.0 mg/dL Final    Bilirubin Direct 08/06/2020 0.1  0.0 - 0.2 mg/dL Final    Bilirubin  Indirect 08/06/2020 0.2  <<=1.2 mg/dL Final    Chloride 08/06/2020 110 (A) 98 - 107 mmol/L Final    Carbon Dioxide 08/06/2020 26  21 - 32 mmol/L Final    Calcium Level Total 08/06/2020 9.0  8.5 - 10.1 mg/dL Final    Creatinine 08/06/2020 0.70  0.60 - 1.30 mg/dL Final    Glucose Level 08/06/2020 70 (A) 74 - 106 mg/dL Final    Globulin 08/06/2020 3.50  2.30 - 3.50 gm/mL Final    Sodium Level 08/06/2020 143  136 - 145 mmol/L Final    Potassium Level 08/06/2020 4.0  3.5 - 5.1 mmol/L Final    Protein Total 08/06/2020 7.5  6.4 - 8.2 gm/dL Final    Cholesterol Total 08/06/2020 168  <<=199 mg/dL Final    Cholesterol/HDL Ratio 08/06/2020 3.8  0.0 - 4.4 Final    HDL Cholesterol 08/06/2020 44  40 - 59 mg/dL Final    LDL Cholesterol 08/06/2020 88  <<=129 mg/dL Final    Triglyceride 08/06/2020 178 (A) <<=149 mg/dL Final    Very Low Density Lipoprotein 08/06/2020 36 (A) <<=29 mg/dL Final    Estimated Average Glucose 08/06/2020 117 (A) <<=115 mg/dL Final    Hemoglobin A1c 08/06/2020 5.7  4.2 - 6.3 % Final    Thyroid Stimulating Hormone 08/06/2020 1.448  0.360 - 3.740 mIU/L Final    Thyroxine Free 08/06/2020 1.01  0.76 - 1.46 ng/dL Final    Vit D 25 OH 08/06/2020 31.9  30.0 - 80.0 ng/mL Final    Estimated GFR- 08/06/2020 >105  >>=90 mL/min Final    Estimated GFR-Non  08/06/2020 91  >>=90 mL/min Final    Abs Neut 08/06/2020 3.71  2.10 - 9.20 x10(3)/mcL Final    Hgb 08/06/2020 13.7  12.0 - 16.0 gm/dL Final    Hct 08/06/2020 41.0  35.0 - 46.0 % Final    MCV 08/06/2020 93.2  80.0 - 100.0 fL Final    MCH 08/06/2020 31.1  26.0 - 34.0 pg Final    MCHC 08/06/2020 33.4  31.0 - 37.0 gm/dL Final    MPV 08/06/2020 11.4 (A) 7.4 - 10.4 fL Final    WBC 08/06/2020 6.6  4.5 - 11.0 x10(3)/mcL Final    RBC 08/06/2020 4.40  4.00 - 5.20 x10(6)/mcL Final    RDW 08/06/2020 13.5  11.5 - 14.5 % Final    Platelet 08/06/2020 180  130 - 400 x10(3)/mcL Final    Basophil % 08/06/2020 1  % Final     Neut # 08/06/2020 3.71  2.10 - 9.20 x10(3)/mcL Final    Lymph # 08/06/2020 1.8  0.6 - 4.6 x10(3)/mcL Final    Mono # 08/06/2020 0.6  0.1 - 1.3 x10(3)/mcL Final    Eos # 08/06/2020 0.3  0.0 - 0.9 x10(3)/mcL Final    Baso # 08/06/2020 0.1  0.0 - 0.2 x10(3)/mcL Final    Neut % 08/06/2020 56  % Final    Lymph % 08/06/2020 28  % Final    Mono % 08/06/2020 10  % Final    Eos % 08/06/2020 5  % Final    IG# 08/06/2020 0.0200   Final    IG% 08/06/2020 0  % Final    Color, UA 08/06/2020 YELLOW  >YELLOW Final    Appearance, UA 08/06/2020 Clear  >Clear Final    Specific Gravity,UA 08/06/2020 1.022  1.005 - 1.030 Final    pH, UA 08/06/2020 5.0  4.5 - 8.0 Final    Leukocytes, UA 08/06/2020 25 (A) >Negative Kina/uL Final    Occult Blood UA 08/06/2020 0.03 (A) >Negative mg/dL Final    Urobilinogen, UA 08/06/2020 Normal  >NORMAL Final    Protein, UA 08/06/2020 Negative  >Negative Final    Nitrite, UA 08/06/2020 Negative  >Negative Final    Ketones, UA 08/06/2020 Negative  >Negative Final      Assessment:     1. Primary hypertension    2. Venous insufficiency of both lower extremities    3. Increased thirst    4. Arthralgia, unspecified joint    5. Gastroesophageal reflux disease with esophagitis without hemorrhage      Plan:   I have changed Ching Rojas's amLODIPine, meloxicam, omeprazole, and VITAMIN D2.  Problem List Items Addressed This Visit        Cardiac/Vascular    Venous insufficiency of both lower extremities    Relevant Orders    CV US Lower Extremity Veins Bilateral Insufficiency    Ambulatory referral/consult to Interventional Cardiology    Hypertension - Primary    Relevant Orders    CBC Auto Differential    Comprehensive Metabolic Panel    TSH    Lipid Panel       GI    Gastroesophageal reflux disease      Other Visit Diagnoses     Increased thirst        Relevant Orders    Hemoglobin A1C    Arthralgia, unspecified joint        Relevant Orders    Vitamin D    Uric Acid    ANTINUCLEAR ANTIBODIES  COMPREHENSIVE PANEL              Medication List with Changes/Refills   Changed and/or Refilled Medications    Modified Medication Previous Medication    AMLODIPINE (NORVASC) 5 MG TABLET amLODIPine (NORVASC) 5 MG tablet       Take 1 tablet (5 mg total) by mouth once daily.    Take 5 mg by mouth once daily.    MELOXICAM (MOBIC) 15 MG TABLET meloxicam (MOBIC) 15 MG tablet       Take 1 tablet (15 mg total) by mouth once daily.    Take 1 tablet by mouth once daily.    OMEPRAZOLE (PRILOSEC) 40 MG CAPSULE omeprazole (PRILOSEC) 40 MG capsule       Take 1 capsule (40 mg total) by mouth once daily.    Take 40 mg by mouth once daily.    VITAMIN D2 1,250 MCG (50,000 UNIT) CAPSULE VITAMIN D2 1,250 mcg (50,000 unit) capsule       Take 1 capsule (50,000 Units total) by mouth every 7 days.    Take 50,000 Units by mouth every 7 days.   Discontinued Medications    AMLODIPINE (NORVASC) 5 MG TABLET    Take 5 mg by mouth once daily.    ESOMEPRAZOLE (NEXIUM) 40 MG CAPSULE    Take 1 capsule by mouth once daily.    INTERFERON TRINA-2B,RECOMB. (INTERFERON TRINA-2B INJ)    interferon trina-2b Take No date recorded No form recorded No frequency recorded No route recorded No set duration recorded No set duration amount recorded suspended No dosage strength recorded No dosage strength units of measure recorded  WAS AN OLD SCRIPT LEFT ON MED REC THAT PATIENT HAS NOT TAKEN IN [facility]            Orders Placed This Encounter   Procedures    CBC Auto Differential     Standing Status:   Future     Standing Expiration Date:   8/9/2023    Comprehensive Metabolic Panel     Standing Status:   Future     Standing Expiration Date:   8/9/2023    TSH     Standing Status:   Future     Standing Expiration Date:   8/9/2023    Hemoglobin A1C     Standing Status:   Future     Standing Expiration Date:   8/9/2023    Vitamin D     Standing Status:   Future     Standing Expiration Date:   8/10/2022    Lipid Panel     Standing Status:   Future     Standing  Expiration Date:   8/9/2023    Uric Acid     Standing Status:   Future     Standing Expiration Date:   8/9/2023    ANTINUCLEAR ANTIBODIES COMPREHENSIVE PANEL     Standing Status:   Future     Standing Expiration Date:   8/9/2023    Ambulatory referral/consult to Interventional Cardiology     Standing Status:   Future     Standing Expiration Date:   7/10/2023     Referral Priority:   Routine     Referral Type:   Consultation     Referral Reason:   Specialty Services Required     Requested Specialty:   Interventional Cardiology     Number of Visits Requested:   1    CV US Lower Extremity Veins Bilateral Insufficiency     Standing Status:   Future     Standing Expiration Date:   7/10/2022     Scheduling Instructions:      Flow studies needed      Please schedule in 2 wks     Order Specific Question:   Release to patient     Answer:   Immediate     Previous medical history/lab work/radiology reviewed and considered during medical management decisions.   Medication list reviewed and medication reconciliation performed.  Patient was provided  and care about his/her current diagnosis (es) and medications including risk/benefit and side effects/adverse events, over the counter medication uses/doses, home self-care and contact precautions,  and red flags and indications for when to seek immediate medical attention.   Patient was advised to continue compliance with current medication list and medical recommendations.  Recommended/ Advised continued compliance with recommended eating habits/ diets for medical conditions and exercise 150 minutes/ week (if possible) for medical condition (s).  Educational handouts and instructions on selected disease management in AVS (After Visit Summary).  All of the patient's questions were answered to patient's satisfaction.   The patient was receptive, expressed verbal understanding and agreement the above plan.    Portions of this encounter dictated using M*Modal Fluency Direct  voice recognition software. Please excuse any typographical errors that might have transpired.    Follow up with  administrative management referral to Internal Medicine or Family Medicine (as of 7/8/2022) for  medical conditions above in 6 wks  AS of 8/3/2022, this patient has not completed the  above labs or studies.      Mg Orozco MD

## 2022-12-07 ENCOUNTER — HOSPITAL ENCOUNTER (EMERGENCY)
Facility: HOSPITAL | Age: 62
Discharge: HOME | End: 2022-12-07
Attending: EMERGENCY MEDICINE
Payer: MEDICAID

## 2022-12-07 VITALS
BODY MASS INDEX: 24.64 KG/M2 | HEART RATE: 75 BPM | DIASTOLIC BLOOD PRESSURE: 79 MMHG | RESPIRATION RATE: 18 BRPM | WEIGHT: 153.31 LBS | SYSTOLIC BLOOD PRESSURE: 144 MMHG | TEMPERATURE: 98 F | HEIGHT: 66 IN | OXYGEN SATURATION: 99 %

## 2022-12-07 DIAGNOSIS — L03.90 CELLULITIS, UNSPECIFIED CELLULITIS SITE: Primary | ICD-10-CM

## 2022-12-07 PROCEDURE — 99283 EMERGENCY DEPT VISIT LOW MDM: CPT

## 2022-12-07 RX ORDER — CEPHALEXIN 500 MG/1
500 CAPSULE ORAL 4 TIMES DAILY
Qty: 20 CAPSULE | Refills: 0 | Status: SHIPPED | OUTPATIENT
Start: 2022-12-07 | End: 2022-12-07 | Stop reason: SDUPTHER

## 2022-12-07 RX ORDER — CEPHALEXIN 500 MG/1
500 CAPSULE ORAL EVERY 12 HOURS
Qty: 20 CAPSULE | Refills: 0 | Status: SHIPPED | OUTPATIENT
Start: 2022-12-07 | End: 2022-12-17

## 2022-12-07 NOTE — ED PROVIDER NOTES
Encounter Date: 12/7/2022       History     Chief Complaint   Patient presents with    Wound Infection     Pt c/o leg wound with redness and swelling x 2 months.      Left lower extremity small area of cellulitis; surround site of recent mild laceration (bumped on edge of coffee table)      Rash   This is a new problem. The current episode started several days ago. The problem has been gradually worsening. Associated with: small laceration. The rash is present on the left lower leg. The pain is at a severity of 2/10. The pain has been Fluctuating since onset. Associated symptoms include itching and pain. Pertinent negatives include no blisters and no weeping. She has tried nothing for the symptoms.   Review of patient's allergies indicates:   Allergen Reactions    Codeine      Other reaction(s): itching, vomiting     Past Medical History:   Diagnosis Date    Anxiety disorder, unspecified     Anxiety with depression     Gastric ulcer     GERD (gastroesophageal reflux disease)     History of Troy-Parkinson-White (WPW) syndrome     HTN (hypertension)     Irregular heart beat     Lumbar radiculopathy     Major psychotic depression, recurrent     Sciatica     Unspecified viral hepatitis C without hepatic coma     Vitamin D deficiency      Past Surgical History:   Procedure Laterality Date    COLONOSCOPY      TONSILLECTOMY AND ADENOIDECTOMY       Family History   Problem Relation Age of Onset    Hypertension Mother     Diabetes Mother     Epilepsy Father     Diabetes Sister     Anemia Brother      Social History     Tobacco Use    Smoking status: Former    Smokeless tobacco: Never   Substance Use Topics    Alcohol use: Yes    Drug use: Never     Review of Systems   Skin:  Positive for itching and rash.   All other systems reviewed and are negative.    Physical Exam     Initial Vitals [12/07/22 1342]   BP Pulse Resp Temp SpO2   (!) 144/79 75 18 97.7 °F (36.5 °C) 99 %      MAP       --         Physical Exam    Nursing note  and vitals reviewed.  Constitutional: She appears well-developed and well-nourished. She is not diaphoretic. No distress.   HENT:   Head: Normocephalic and atraumatic.   Eyes: EOM are normal. Pupils are equal, round, and reactive to light. Right eye exhibits no discharge. Left eye exhibits no discharge.   Neck: Neck supple. No thyromegaly present. No tracheal deviation present.   Normal range of motion.  Cardiovascular:  Normal rate, regular rhythm and normal heart sounds.     Exam reveals no gallop and no friction rub.       No murmur heard.  Pulmonary/Chest: Breath sounds normal. No stridor. No respiratory distress. She has no wheezes. She has no rales.   Abdominal: Abdomen is soft. She exhibits no distension. There is no abdominal tenderness. There is no rebound.   Musculoskeletal:         General: Edema present. Normal range of motion.      Cervical back: Normal range of motion and neck supple.      Comments: Chronic bilateral edema; known venous stasis     Neurological: She is alert and oriented to person, place, and time. She has normal strength. GCS score is 15. GCS eye subscore is 4. GCS verbal subscore is 5. GCS motor subscore is 6.   Skin: Skin is warm and dry.   Small area of erythema surrounding small recent cutaneous discontinuity; no induration, fluctuance, no drainage - nothing to suggest drainable collection   Psychiatric: She has a normal mood and affect. Her behavior is normal. Judgment and thought content normal.       ED Course   Procedures  Labs Reviewed - No data to display       Imaging Results    None          Medications - No data to display  Medical Decision Making:   History:   Old Medical Records: I decided to obtain old medical records.  Old Records Summarized: other records.       <> Summary of Records: Previous record confirm known chronic venous stasis; demonstrated on previous ultrasound - patient endorsing no change in course of edema. She reports she exercises regularly  Initial  Assessment:   Generally well appearing woman with small left lower extremity cellulitis; nothing suggestive of systemic inflammatory reaction.  Differential Diagnosis:   Superficial cellulitis  ED Management:  Stable in the ED. Plan initiate po antibiotics. Home with anticipatory guidance, return precautions, fu instructions                        Clinical Impression:   Final diagnoses:  [L03.90] Cellulitis, unspecified cellulitis site (Primary)      ED Disposition Condition    Discharge Stable          ED Prescriptions       Medication Sig Dispense Start Date End Date Auth. Provider    cephALEXin (KEFLEX) 500 MG capsule Take 1 capsule (500 mg total) by mouth 4 (four) times daily. for 5 days 20 capsule 12/7/2022 12/12/2022 Roman Rosas MD          Follow-up Information    None          Roman Rosas MD  12/07/22 8060

## 2023-02-08 RX ORDER — ESOMEPRAZOLE MAGNESIUM 40 MG/1
CAPSULE, DELAYED RELEASE ORAL
COMMUNITY
End: 2023-07-29 | Stop reason: ALTCHOICE

## 2023-02-10 ENCOUNTER — HOSPITAL ENCOUNTER (OUTPATIENT)
Dept: RADIOLOGY | Facility: HOSPITAL | Age: 63
Discharge: HOME | End: 2023-02-10
Payer: MEDICAID

## 2023-02-10 ENCOUNTER — OFFICE VISIT (OUTPATIENT)
Dept: INTERNAL MEDICINE | Facility: CLINIC | Age: 63
End: 2023-02-10
Payer: MEDICAID

## 2023-02-10 VITALS
RESPIRATION RATE: 20 BRPM | DIASTOLIC BLOOD PRESSURE: 90 MMHG | HEART RATE: 70 BPM | TEMPERATURE: 98 F | SYSTOLIC BLOOD PRESSURE: 146 MMHG | BODY MASS INDEX: 25.82 KG/M2 | OXYGEN SATURATION: 100 % | HEIGHT: 64 IN | WEIGHT: 151.25 LBS

## 2023-02-10 DIAGNOSIS — I83.813 VARICOSE VEINS OF BOTH LOWER EXTREMITIES WITH PAIN: ICD-10-CM

## 2023-02-10 DIAGNOSIS — Z87.891 FORMER SMOKER: ICD-10-CM

## 2023-02-10 DIAGNOSIS — M79.662 PAIN IN BOTH LOWER LEGS: ICD-10-CM

## 2023-02-10 DIAGNOSIS — Z78.0 POST-MENOPAUSAL: ICD-10-CM

## 2023-02-10 DIAGNOSIS — Z12.4 CERVICAL CANCER SCREENING: ICD-10-CM

## 2023-02-10 DIAGNOSIS — Z13.1 SCREENING FOR DIABETES MELLITUS: ICD-10-CM

## 2023-02-10 DIAGNOSIS — F32.A ANXIETY AND DEPRESSION: ICD-10-CM

## 2023-02-10 DIAGNOSIS — Z13.29 SCREENING FOR THYROID DISORDER: ICD-10-CM

## 2023-02-10 DIAGNOSIS — M25.562 CHRONIC PAIN OF BOTH KNEES: ICD-10-CM

## 2023-02-10 DIAGNOSIS — M79.661 PAIN IN BOTH LOWER LEGS: ICD-10-CM

## 2023-02-10 DIAGNOSIS — M25.561 CHRONIC PAIN OF BOTH KNEES: ICD-10-CM

## 2023-02-10 DIAGNOSIS — G89.29 CHRONIC PAIN OF BOTH KNEES: ICD-10-CM

## 2023-02-10 DIAGNOSIS — I45.6 WPW (WOLFF-PARKINSON-WHITE SYNDROME): Primary | ICD-10-CM

## 2023-02-10 DIAGNOSIS — Z12.31 BREAST CANCER SCREENING BY MAMMOGRAM: ICD-10-CM

## 2023-02-10 DIAGNOSIS — Z13.31 POSITIVE DEPRESSION SCREENING: ICD-10-CM

## 2023-02-10 DIAGNOSIS — Z23 IMMUNIZATION DUE: ICD-10-CM

## 2023-02-10 DIAGNOSIS — E55.9 VITAMIN D DEFICIENCY: ICD-10-CM

## 2023-02-10 DIAGNOSIS — F41.9 ANXIETY AND DEPRESSION: ICD-10-CM

## 2023-02-10 DIAGNOSIS — Z13.0 SCREENING FOR IRON DEFICIENCY ANEMIA: ICD-10-CM

## 2023-02-10 DIAGNOSIS — Z11.3 ROUTINE SCREENING FOR STI (SEXUALLY TRANSMITTED INFECTION): ICD-10-CM

## 2023-02-10 DIAGNOSIS — I10 PRIMARY HYPERTENSION: ICD-10-CM

## 2023-02-10 PROCEDURE — 1160F PR REVIEW ALL MEDS BY PRESCRIBER/CLIN PHARMACIST DOCUMENTED: ICD-10-PCS | Mod: CPTII,,,

## 2023-02-10 PROCEDURE — 3080F DIAST BP >= 90 MM HG: CPT | Mod: CPTII,,,

## 2023-02-10 PROCEDURE — 3008F BODY MASS INDEX DOCD: CPT | Mod: CPTII,,,

## 2023-02-10 PROCEDURE — 3077F PR MOST RECENT SYSTOLIC BLOOD PRESSURE >= 140 MM HG: ICD-10-PCS | Mod: CPTII,,,

## 2023-02-10 PROCEDURE — 1160F RVW MEDS BY RX/DR IN RCRD: CPT | Mod: CPTII,,,

## 2023-02-10 PROCEDURE — 1159F PR MEDICATION LIST DOCUMENTED IN MEDICAL RECORD: ICD-10-PCS | Mod: CPTII,,,

## 2023-02-10 PROCEDURE — 73562 X-RAY EXAM OF KNEE 3: CPT | Mod: TC,50

## 2023-02-10 PROCEDURE — 3008F PR BODY MASS INDEX (BMI) DOCUMENTED: ICD-10-PCS | Mod: CPTII,,,

## 2023-02-10 PROCEDURE — 99215 OFFICE O/P EST HI 40 MIN: CPT | Mod: PBBFAC

## 2023-02-10 PROCEDURE — 99214 PR OFFICE/OUTPT VISIT, EST, LEVL IV, 30-39 MIN: ICD-10-PCS | Mod: S$PBB,,,

## 2023-02-10 PROCEDURE — 3077F SYST BP >= 140 MM HG: CPT | Mod: CPTII,,,

## 2023-02-10 PROCEDURE — 90472 IMMUNIZATION ADMIN EACH ADD: CPT | Mod: PBBFAC

## 2023-02-10 PROCEDURE — 1159F MED LIST DOCD IN RCRD: CPT | Mod: CPTII,,,

## 2023-02-10 PROCEDURE — 3080F PR MOST RECENT DIASTOLIC BLOOD PRESSURE >= 90 MM HG: ICD-10-PCS | Mod: CPTII,,,

## 2023-02-10 PROCEDURE — 90471 IMMUNIZATION ADMIN: CPT | Mod: PBBFAC

## 2023-02-10 PROCEDURE — 99214 OFFICE O/P EST MOD 30 MIN: CPT | Mod: S$PBB,,,

## 2023-02-10 RX ORDER — VIT C/E/ZN/COPPR/LUTEIN/ZEAXAN 250MG-90MG
2000 CAPSULE ORAL DAILY
Qty: 180 CAPSULE | Refills: 1 | Status: SHIPPED | OUTPATIENT
Start: 2023-02-10 | End: 2023-07-27 | Stop reason: SDUPTHER

## 2023-02-10 RX ORDER — MUPIROCIN 20 MG/G
OINTMENT TOPICAL 3 TIMES DAILY
COMMUNITY
Start: 2023-01-21 | End: 2023-10-26 | Stop reason: SDUPTHER

## 2023-02-10 RX ORDER — AMLODIPINE BESYLATE 5 MG/1
5 TABLET ORAL DAILY
Qty: 90 TABLET | Refills: 1 | Status: SHIPPED | OUTPATIENT
Start: 2023-02-10 | End: 2023-02-10

## 2023-02-10 RX ORDER — VIT C/E/ZN/COPPR/LUTEIN/ZEAXAN 250MG-90MG
3000 CAPSULE ORAL DAILY
COMMUNITY
End: 2023-02-10 | Stop reason: SDUPTHER

## 2023-02-10 RX ORDER — AMLODIPINE BESYLATE 5 MG/1
5 TABLET ORAL DAILY
Qty: 90 TABLET | Refills: 1 | Status: SHIPPED | OUTPATIENT
Start: 2023-02-10 | End: 2023-07-27 | Stop reason: SDUPTHER

## 2023-02-10 NOTE — PROGRESS NOTES
PATIENT NAME: Ching Rojas  : 1960  DATE: 2/10/23  MRN: [id number]          Reason for Visit/Chief Complaint   Establish Care, Leg Pain, and Knee Pain       History of Present Illness (HPI)     Ching Rojas is a 62 y.o. White female presenting in clinic today to Establish Care, Leg Pain, and Knee Pain. Previous PCP: Dr. Mg Orozco. PMH: Hep C, hypertension, GERD, vitamin D deficiency, Wolfman Parkinson White syndrome/irregular heartbeat/palpitations, lumbar DDD with radiculopathy, venous insufficiency, major depression, and anxiety. FHx of BP disorder.     H/o WPW, she endorses palpitations periodically especially if she drinks caffeine.     She is c/o pain in legs with varicose veins. She states she was previously prescribed gabapentin, but it made her too drowsy. Of note, she was previously ordered US of BLE, but she states she did not have transportation at the time. She was also previously referred to Interventional Cardiology. She has multiple bruises on arms and legs. She states she is very clumsy and bumps everything. She has a LLE wound she states has been there for approximately 4 years due to constantly bumping it.     GAD7-4 and PHQ4-4. She states her daughter, mother, and father passed away within the last few years. She is having family issues with her sister. I have reviewed the patient's positive depression score. Recommendation based on score is to refer to .    She is a former cigarette smoker - quit approximately 10 years ago. Denies alcohol or illicit drug use. Denies chest pain, shortness of breath, cough, headache, dizziness, weakness, abdominal pain, nausea, vomiting, diarrhea, constipation, dysuria, SI, and HI.     Cervical Cancer Screening - Referral to [facility] (2/10/2023). Breast Cancer Screening - Last Mammogram on 3/6/2015. MMG ordered (2/10/2023).  Colon Cancer Screening - Colonoscopy on 3/23/2018. F/U Colonoscopy in 10 years.  Osteoporosis Screening - Last DEXA  on 3/6/2015. Results show Osteopenia. DEXA scan ordered (2/10/2023).  Lung cancer screeing - LDCT ordered.  Eye Exam - Several years. List of local eye doctors provided to patient.  Dental Exam - Several years. List of local dentists provided to patient.  Vaccinations: Flu - 2/10/2023 / Shingles - 2/10/2023 / Tetanus - 6/7/2017/ Covid - 7/11/2022       Review of Systems     Review of Systems   Constitutional: Negative.    HENT: Negative.     Eyes: Negative.    Respiratory: Negative.     Cardiovascular: Negative.    Gastrointestinal: Negative.    Endocrine: Negative.    Genitourinary: Negative.    Musculoskeletal:  Positive for arthralgias.   Skin: Negative.    Allergic/Immunologic: Negative.    Neurological: Negative.    Hematological:  Bruises/bleeds easily.   Psychiatric/Behavioral:  Negative for self-injury and sleep disturbance. The patient is nervous/anxious.    All other systems reviewed and are negative.    Medical / Social / Family History     Past Medical History:   Diagnosis Date    Anxiety disorder, unspecified     Anxiety with depression     Gastric ulcer     GERD (gastroesophageal reflux disease)     History of Troy-Parkinson-White (WPW) syndrome     HTN (hypertension)     Irregular heart beat     Lumbar radiculopathy     Major psychotic depression, recurrent     Sciatica     Unspecified viral hepatitis C without hepatic coma     Vitamin D deficiency          Past Surgical History:   Procedure Laterality Date    COLONOSCOPY      TONSILLECTOMY AND ADENOIDECTOMY           Social History  Ching Rojas's  reports that she has quit smoking. She has never used smokeless tobacco. She reports current alcohol use. She reports that she does not use drugs.    Family History  Ching Friass family history includes Anemia in her brother; Diabetes in her mother and sister; Epilepsy in her father; Hypertension in her mother.    Medications and Allergies     Medications  Current Outpatient Medications   Medication  "Instructions    amLODIPine (NORVASC) 5 mg, Oral, Daily    cholecalciferol (vitamin D3) (VITAMIN D3) 2,000 Units, Oral, Daily    esomeprazole (NEXIUM) 40 MG capsule Nexium Take 1 capsule (Oral) 1 time per day for 30 days No date recorded capsule,delayed release(DR/EC) 1 time per day Oral 30 days suspended 40 mg    mupirocin (BACTROBAN) 2 % ointment Topical (Top), 3 times daily       Allergies  Review of patient's allergies indicates:   Allergen Reactions    Codeine      Other reaction(s): itching, vomiting  Other reaction(s): itching, vomiting, Not Indicated  Other reaction(s): itching, vomiting       Physical Examination   Visit Vitals  BP (!) 146/90 (BP Location: Left arm, Patient Position: Sitting, BP Method: Small (Manual))   Pulse 70   Temp 97.7 °F (36.5 °C) (Oral)   Resp 20   Ht 5' 4" (1.626 m)   Wt 68.6 kg (151 lb 3.8 oz)   SpO2 100%   BMI 25.96 kg/m²     Physical Exam  Vitals reviewed.   Constitutional:       Appearance: Normal appearance. She is normal weight.   HENT:      Head: Normocephalic and atraumatic.      Right Ear: External ear normal.      Left Ear: External ear normal.      Nose: Nose normal.      Mouth/Throat:      Mouth: Mucous membranes are moist.      Pharynx: Oropharynx is clear.   Eyes:      Extraocular Movements: Extraocular movements intact.      Conjunctiva/sclera: Conjunctivae normal.      Pupils: Pupils are equal, round, and reactive to light.   Cardiovascular:      Rate and Rhythm: Normal rate and regular rhythm.      Pulses: Normal pulses.      Heart sounds: Normal heart sounds.   Pulmonary:      Effort: Pulmonary effort is normal.      Breath sounds: Normal breath sounds.   Abdominal:      General: Bowel sounds are normal.      Palpations: Abdomen is soft.   Musculoskeletal:         General: Normal range of motion.      Cervical back: Normal range of motion and neck supple.   Skin:     General: Skin is warm and dry.      Capillary Refill: Capillary refill takes less than 2 seconds. "             Comments: Red lines=varicose veins   Neurological:      General: No focal deficit present.      Mental Status: She is alert and oriented to person, place, and time.   Psychiatric:         Mood and Affect: Mood normal.         Behavior: Behavior normal.         Thought Content: Thought content normal.         Judgment: Judgment normal.         Results     Lab Results   Component Value Date    WBC 6.6 08/06/2020    RBC 4.40 08/06/2020    HGB 13.7 08/06/2020    HCT 41.0 08/06/2020    MCV 93.2 08/06/2020    MCH 31.1 08/06/2020    MCHC 33.4 08/06/2020    RDW 13.5 08/06/2020     08/06/2020    MPV 11.4 (H) 08/06/2020      Lab Results   Component Value Date     08/06/2020    K 4.0 08/06/2020    CHLORIDE 110 (H) 08/06/2020    CO2 26 08/06/2020    GLUCOSE 70 (L) 08/06/2020    BUN 18 08/06/2020    CREATININE 0.70 08/06/2020    LABPROT 7.5 08/06/2020    ALBUMIN 4.0 08/06/2020    BILITOT 0.3 08/06/2020    ALKPHOS 93 08/06/2020    AST 14 (L) 08/06/2020    ALT 23 08/06/2020     Lab Results   Component Value Date    TSH 1.448 08/06/2020     Lab Results   Component Value Date    CHOL 168 08/06/2020    HDL 44 08/06/2020    LDL 88 08/06/2020    TRIG 178 (H) 08/06/2020     Lab Results   Component Value Date    PROTEINUA Negative 08/06/2020    BILIRUBINUA Negative 08/06/2020    WBCUA 0-2 08/06/2020    RBCUA 0-2 08/06/2020    BACTERIA None Seen 08/06/2020    LEUKOCYTESUR 25 (A) 08/06/2020    UROBILINOGEN Normal 08/06/2020     No results found for: MICROALBCREA, CREATRANDUR, MICALBCREAT  Lab Results   Component Value Date    RVGLZJOW60FZ 31.9 08/06/2020     Lab Results   Component Value Date    HIV Nonreactive 02/01/2018    HEPAIGM Nonreactive 02/01/2018    HEPBCOREM Nonreactive 02/01/2018    HEPCAB Reactive (A) 02/01/2018       Assessment and Plan (including Health Maintenance)     1. WPW (Troy-Parkinson-White syndrome)  - Ambulatory referral/consult to Cardiology; Future    2. Primary hypertension  - Ambulatory  referral/consult to Cardiology; Future  - amLODIPine (NORVASC) 5 MG tablet; Take 1 tablet (5 mg total) by mouth once daily.  Dispense: 90 tablet; Refill: 1  - CBC Auto Differential; Future  - Comprehensive Metabolic Panel; Future  - Lipid Panel; Future  - Microalbumin/Creatinine Ratio, Urine; Future  - Urinalysis, Reflex to Urine Culture; Future  BP Readings from Last 3 Encounters:   02/10/23 (!) 146/90   12/07/22 (!) 144/79   06/10/22 113/69      Not at goal.  Follow a low sodium (less than 2 grams of sodium per day), DASH diet.   Continue amlodipine as prescribed - refilled today.  If greater than or equal to 140/90 at next office visit, increase amlodipine to 10 mg.  Monitor blood pressure and report any consistent values greater than 140/90 and keep a log.  Encouraged continued smoking cessation to aid in BP reduction and co-morbidities.   Maintain healthy weight with a BMI goal of <30.   Aerobic exercise for 150 minutes per week (or 5 days a week for 30 minutes each day).     3. Varicose veins of both lower extremities with pain  - Ambulatory referral/consult to Cardiology; Future  - CV Ultrasound doppler venous legs bilat; Future    4. Pain in both lower legs  - CV Ultrasound doppler venous legs bilat; Future  - Sedimentation rate; Future  - C-Reactive Protein; Future  - CYCLIC CITRULLINATED PEPTIDE (CCP) ANTIBODY; Future    5. Chronic pain of both knees  - X-Ray Knee 3 View Bilateral; Future  - Sedimentation rate; Future  - C-Reactive Protein; Future  - CYCLIC CITRULLINATED PEPTIDE (CCP) ANTIBODY; Future  - Uric Acid; Future  - C4 Complement; Future  - Rheumatoid Factors, IgA, IgG, IgM; Future  Perform knee exercises daily.   Avoid activities than increase knee pain or stiffness.   Apply heating pad, ice pack, and BioFreeze/topical capsaicin as needed; alternate every 15-20 minutes.   Continue tylenol arthritis/NSAID OTC as needed.     6. Vitamin D deficiency  - cholecalciferol, vitamin D3, (VITAMIN D3) 25  mcg (1,000 unit) capsule; Take 2 capsules (2,000 Units total) by mouth once daily.  Dispense: 180 capsule; Refill: 1  - Vitamin D; Future    7. Former smoker  - CT Chest Lung Screening Low Dose; Future  Encouraged continued smoking cessation.    8. Post-menopausal  - DXA Bone Density Spine And Hip; Future    9. Breast cancer screening by mammogram  - Mammo Digital Screening Bilat w/ Pete; Future    10. Cervical cancer screening  - Ambulatory referral/consult to Gynecology; Future    11. Anxiety and depression  - Ambulatory referral/consult to Behavioral Health; Future  PHQ9 2/10/2023   Total Score 4   JENNIFER-7 Score: 4  Interpretation: Normal  Referral to [facility] (appt 3/27/2023).  Denies SI/HI.  Read positive daily meditations, avoid negative media, set healthy boundaries.   Exercise daily, keep consistent sleep pattern, eat a healthy diet.   Establish good social support, make changes to reduce stress.   Do not drink alcohol or use illicit drugs.   Reports any symptoms of suicidal/homicidal ideations or self harm immediately, go to nearest emergency room.      12. Positive depression screening  - Ambulatory referral/consult to Behavioral Health; Future  See #11.    13. Immunization due  - Influenza - Quadrivalent *Preferred* (6 months+) (PF)  - Zoster Recombinant Vaccine  Written and verbal consent obtained.  Influenza vaccine was administered.  Ok to take acetaminophen for soreness of arm.     14. BMI 25.0-25.9,adult  Body mass index is 25.96 kg/m². (At goal).     15. Screening for thyroid disorder  - T4, Free; Future  - TSH; Future    16. Routine screening for STI (sexually transmitted infection)  - Chlamydia/GC, PCR; Future  - SYPHILIS ANTIBODY (WITH REFLEX RPR); Future    17. Screening for iron deficiency anemia  - Ferritin; Future  - Iron and TIBC; Future    18. Screening for diabetes mellitus  - Hemoglobin A1C; Future      Health Maintenance Due   Topic Date Due    Cervical Cancer Screening   Never done    Mammogram  03/06/2016    Colorectal Cancer Screening  01/30/2019    COVID-19 Vaccine (2 - Pfizer series) 08/01/2022     Tests to Keep You Healthy    Mammogram: ORDERED BUT NOT SCHEDULED  Colon Cancer Screening: DUE  Cervical Cancer Screening: DUE  Last Blood Pressure <= 139/89 (2/10/2023): NO      Health Maintenance Topics with due status: Not Due       Topic Last Completion Date    TETANUS VACCINE 06/07/2017    Hemoglobin A1c (Diabetic Prevention Screening) 08/06/2020    Lipid Panel 08/06/2020    Shingles Vaccine 02/10/2023       Future Appointments   Date Time Provider Department Center   3/27/2023 10:00 AM [facility]   3/27/2023 12:30 PM CECIL Samaniego [facility]        Follow up in about 4 weeks (around 3/10/2023) for BP check, F2F, Lab review, Med check.  RTC PRN.  Labs within a week of visit.        Signature:        CECIL Samaniego  [facility]  [address] [facility] [id number]    Date of encounter: 2/10/23

## 2023-02-11 ENCOUNTER — TELEPHONE (OUTPATIENT)
Dept: INTERNAL MEDICINE | Facility: CLINIC | Age: 63
End: 2023-02-11
Payer: MEDICAID

## 2023-02-12 NOTE — TELEPHONE ENCOUNTER
"Please inform Ching Rojas of the following:    Knee xrays shows degenerative changes "arthritis."     Arthritis education:  Physical activity 5 times a week for 30 minutes per day (or 150 minutes per week).   Avoid activities than increase pain or stiffness.   Apply heating pad, ice pack, and BioFreeze/topical capsaicin as needed; alternate every 15-20 minutes.   Continue NSAID such as ibuprofen, motrin, or advil as needed.    For any questions, please let me know.  Thank you,    CHRIS NowakC   "

## 2023-02-13 NOTE — TELEPHONE ENCOUNTER
Attempted to contact pt at number listed in chart, no answer. Unable to leave a  VM due to no VM set up.

## 2023-02-20 ENCOUNTER — APPOINTMENT (OUTPATIENT)
Dept: LAB | Facility: HOSPITAL | Age: 63
End: 2023-02-20
Payer: MEDICAID

## 2023-03-10 ENCOUNTER — DOCUMENTATION ONLY (OUTPATIENT)
Dept: INTERNAL MEDICINE | Facility: CLINIC | Age: 63
End: 2023-03-10
Payer: MEDICAID

## 2023-03-20 DIAGNOSIS — I83.813 VARICOSE VEINS OF BOTH LOWER EXTREMITIES WITH PAIN: Primary | ICD-10-CM

## 2023-07-27 ENCOUNTER — OFFICE VISIT (OUTPATIENT)
Dept: INTERNAL MEDICINE | Facility: CLINIC | Age: 63
End: 2023-07-27
Payer: MEDICAID

## 2023-07-27 VITALS
HEIGHT: 64 IN | OXYGEN SATURATION: 96 % | RESPIRATION RATE: 20 BRPM | HEART RATE: 73 BPM | DIASTOLIC BLOOD PRESSURE: 78 MMHG | SYSTOLIC BLOOD PRESSURE: 126 MMHG | TEMPERATURE: 98 F | WEIGHT: 145.06 LBS | BODY MASS INDEX: 24.77 KG/M2

## 2023-07-27 DIAGNOSIS — Z91.199 NONCOMPLIANCE WITH DIAGNOSTIC TESTING: ICD-10-CM

## 2023-07-27 DIAGNOSIS — Z23 IMMUNIZATION DUE: ICD-10-CM

## 2023-07-27 DIAGNOSIS — R76.8 RHEUMATOID FACTOR POSITIVE: ICD-10-CM

## 2023-07-27 DIAGNOSIS — I10 PRIMARY HYPERTENSION: Primary | ICD-10-CM

## 2023-07-27 DIAGNOSIS — E55.9 VITAMIN D DEFICIENCY: ICD-10-CM

## 2023-07-27 PROCEDURE — 3066F NEPHROPATHY DOC TX: CPT | Mod: CPTII,,,

## 2023-07-27 PROCEDURE — 1159F PR MEDICATION LIST DOCUMENTED IN MEDICAL RECORD: ICD-10-PCS | Mod: CPTII,,,

## 2023-07-27 PROCEDURE — 1160F RVW MEDS BY RX/DR IN RCRD: CPT | Mod: CPTII,,,

## 2023-07-27 PROCEDURE — 99214 OFFICE O/P EST MOD 30 MIN: CPT | Mod: S$PBB,,,

## 2023-07-27 PROCEDURE — 3078F DIAST BP <80 MM HG: CPT | Mod: CPTII,,,

## 2023-07-27 PROCEDURE — 3078F PR MOST RECENT DIASTOLIC BLOOD PRESSURE < 80 MM HG: ICD-10-PCS | Mod: CPTII,,,

## 2023-07-27 PROCEDURE — 1160F PR REVIEW ALL MEDS BY PRESCRIBER/CLIN PHARMACIST DOCUMENTED: ICD-10-PCS | Mod: CPTII,,,

## 2023-07-27 PROCEDURE — 3066F PR DOCUMENTATION OF TREATMENT FOR NEPHROPATHY: ICD-10-PCS | Mod: CPTII,,,

## 2023-07-27 PROCEDURE — 1159F MED LIST DOCD IN RCRD: CPT | Mod: CPTII,,,

## 2023-07-27 PROCEDURE — 3008F BODY MASS INDEX DOCD: CPT | Mod: CPTII,,,

## 2023-07-27 PROCEDURE — 3074F SYST BP LT 130 MM HG: CPT | Mod: CPTII,,,

## 2023-07-27 PROCEDURE — 90471 IMMUNIZATION ADMIN: CPT | Mod: PBBFAC

## 2023-07-27 PROCEDURE — 3044F PR MOST RECENT HEMOGLOBIN A1C LEVEL <7.0%: ICD-10-PCS | Mod: CPTII,,,

## 2023-07-27 PROCEDURE — 90750 HZV VACC RECOMBINANT IM: CPT | Mod: PBBFAC

## 2023-07-27 PROCEDURE — 99214 PR OFFICE/OUTPT VISIT, EST, LEVL IV, 30-39 MIN: ICD-10-PCS | Mod: S$PBB,,,

## 2023-07-27 PROCEDURE — 3061F NEG MICROALBUMINURIA REV: CPT | Mod: CPTII,,,

## 2023-07-27 PROCEDURE — 3044F HG A1C LEVEL LT 7.0%: CPT | Mod: CPTII,,,

## 2023-07-27 PROCEDURE — 3074F PR MOST RECENT SYSTOLIC BLOOD PRESSURE < 130 MM HG: ICD-10-PCS | Mod: CPTII,,,

## 2023-07-27 PROCEDURE — 3061F PR NEG MICROALBUMINURIA RESULT DOCUMENTED/REVIEW: ICD-10-PCS | Mod: CPTII,,,

## 2023-07-27 PROCEDURE — 99215 OFFICE O/P EST HI 40 MIN: CPT | Mod: PBBFAC

## 2023-07-27 PROCEDURE — 3008F PR BODY MASS INDEX (BMI) DOCUMENTED: ICD-10-PCS | Mod: CPTII,,,

## 2023-07-27 RX ORDER — VIT C/E/ZN/COPPR/LUTEIN/ZEAXAN 250MG-90MG
2000 CAPSULE ORAL DAILY
Qty: 180 CAPSULE | Refills: 1 | Status: SHIPPED | OUTPATIENT
Start: 2023-07-27 | End: 2023-11-13 | Stop reason: SDUPTHER

## 2023-07-27 RX ORDER — DICLOFENAC SODIUM 50 MG/1
50 TABLET, DELAYED RELEASE ORAL 2 TIMES DAILY
COMMUNITY
Start: 2023-07-10 | End: 2023-10-26 | Stop reason: SDUPTHER

## 2023-07-27 RX ORDER — CYCLOBENZAPRINE HCL 10 MG
10 TABLET ORAL 3 TIMES DAILY
COMMUNITY
Start: 2023-07-10 | End: 2023-11-13

## 2023-07-27 RX ORDER — AMLODIPINE BESYLATE 5 MG/1
5 TABLET ORAL DAILY
Qty: 90 TABLET | Refills: 1 | Status: SHIPPED | OUTPATIENT
Start: 2023-07-27 | End: 2023-10-26

## 2023-07-27 NOTE — PROGRESS NOTES
PATIENT NAME: Ching Rojas  : 1960  DATE: 23  MRN: [id number]          Reason for Visit/Chief Complaint   Follow-up and Back Pain       History of Present Illness (HPI)     Ching Rojas is a 62 y.o. White female presenting in clinic today to Follow-up and Back Pain. Previous PCP: Dr. Mg Orozco. PMH: Hep C, hypertension, GERD, vitamin D deficiency, Troy Parkinson White syndrome/irregular heartbeat/palpitations, lumbar DDD with radiculopathy, venous insufficiency, major depression, and anxiety. FHx of BP disorder. She reports anxiety and depression managed at [facility].      She is a former cigarette smoker - quit approximately 10 years ago. Denies alcohol or illicit drug use. Denies chest pain, shortness of breath, cough, headache, dizziness, weakness, abdominal pain, nausea, vomiting, diarrhea, constipation, dysuria, SI, and HI.     Cervical Cancer Screening - Referral to [facility] (2/10/2023). Breast Cancer Screening - Last Mammogram on 3/6/2015. MMG ordered (2/10/2023).  Colon Cancer Screening - Colonoscopy on 3/23/2018. F/U Colonoscopy in 10 years.  Osteoporosis Screening - Last DEXA on 3/6/2015. Results show Osteopenia. DEXA scan ordered (2/10/2023).  Lung cancer screeing - LDCT ordered.  Eye Exam - Several years. List of local eye doctors provided to patient.  Dental Exam - Several years. List of local dentists provided to patient.  Vaccinations: Flu - 2/10/2023 / Shingles - 2/10/2023 / Tetanus - 2017/ Covid - 2022       Review of Systems     Review of Systems   Constitutional: Negative.    HENT: Negative.     Eyes: Negative.    Respiratory: Negative.     Cardiovascular: Negative.    Gastrointestinal: Negative.    Endocrine: Negative.    Genitourinary: Negative.    Musculoskeletal:  Positive for arthralgias.   Skin: Negative.    Allergic/Immunologic: Negative.    Neurological: Negative.    Hematological:  Bruises/bleeds easily.   Psychiatric/Behavioral:  Negative  for self-injury and sleep disturbance. The patient is nervous/anxious.    All other systems reviewed and are negative.    Medical / Social / Family History     Past Medical History:   Diagnosis Date    Anxiety disorder, unspecified     Anxiety with depression     Gastric ulcer     GERD (gastroesophageal reflux disease)     History of Troy-Parkinson-White (WPW) syndrome     HTN (hypertension)     Irregular heart beat     Lumbar radiculopathy     Major psychotic depression, recurrent     Sciatica     Unspecified viral hepatitis C without hepatic coma     Vitamin D deficiency          Past Surgical History:   Procedure Laterality Date    COLONOSCOPY      TONSILLECTOMY AND ADENOIDECTOMY           Social History  Ching Mcintosh  reports that she has quit smoking. She has never used smokeless tobacco. She reports current alcohol use. She reports that she does not use drugs.    Family History  Ching Mcintosh family history includes Anemia in her brother; Diabetes in her mother and sister; Epilepsy in her father; Hypertension in her mother.    Medications and Allergies     Medications  Current Outpatient Medications   Medication Instructions    amLODIPine (NORVASC) 5 mg, Oral, Daily    cholecalciferol (vitamin D3) (VITAMIN D3) 2,000 Units, Oral, Daily    cyclobenzaprine (FLEXERIL) 10 mg, Oral, 3 times daily    diclofenac (VOLTAREN) 50 mg, Oral, 2 times daily    esomeprazole (NEXIUM) 40 MG capsule Nexium Take 1 capsule (Oral) 1 time per day for 30 days No date recorded capsule,delayed release(DR/EC) 1 time per day Oral 30 days suspended 40 mg    mupirocin (BACTROBAN) 2 % ointment Topical (Top), 3 times daily       Allergies  Review of patient's allergies indicates:   Allergen Reactions    Codeine Itching     Other reaction(s): itching, vomiting  Other reaction(s): itching, vomiting, Not Indicated  Other reaction(s): itching, vomiting       Physical Examination   Visit Vitals  /78 (BP Location: Right arm, Patient  "Position: Sitting, BP Method: Small (Automatic))   Pulse 73   Temp 97.9 °F (36.6 °C) (Oral)   Resp 20   Ht 5' 4" (1.626 m)   Wt 65.8 kg (145 lb 1 oz)   SpO2 96%   BMI 24.90 kg/m²     Physical Exam  Vitals reviewed.   Constitutional:       Appearance: Normal appearance. She is normal weight.   HENT:      Head: Normocephalic and atraumatic.      Right Ear: External ear normal.      Left Ear: External ear normal.      Nose: Nose normal.      Mouth/Throat:      Mouth: Mucous membranes are moist.      Pharynx: Oropharynx is clear.   Eyes:      Extraocular Movements: Extraocular movements intact.      Conjunctiva/sclera: Conjunctivae normal.      Pupils: Pupils are equal, round, and reactive to light.   Cardiovascular:      Rate and Rhythm: Normal rate and regular rhythm.      Pulses: Normal pulses.      Heart sounds: Normal heart sounds.   Pulmonary:      Effort: Pulmonary effort is normal.      Breath sounds: Normal breath sounds.   Abdominal:      General: Bowel sounds are normal.      Palpations: Abdomen is soft.   Musculoskeletal:         General: Normal range of motion.      Cervical back: Normal range of motion and neck supple.   Skin:     General: Skin is warm and dry.      Capillary Refill: Capillary refill takes less than 2 seconds.             Comments: Red lines=varicose veins   Neurological:      General: No focal deficit present.      Mental Status: She is alert and oriented to person, place, and time.   Psychiatric:         Mood and Affect: Mood normal.         Behavior: Behavior normal.         Thought Content: Thought content normal.         Judgment: Judgment normal.         Results     Lab Results   Component Value Date    WBC 6.7 02/20/2023    RBC 4.79 02/20/2023    HGB 14.6 02/20/2023    HCT 43.5 02/20/2023    MCV 90.8 02/20/2023    MCH 30.5 02/20/2023    MCHC 33.6 02/20/2023    RDW 13.2 02/20/2023     02/20/2023    MPV 11.3 (H) 02/20/2023      Lab Results   Component Value Date     " 02/20/2023    K 4.1 02/20/2023    CHLORIDE 109 (H) 02/20/2023    CO2 27 02/20/2023    GLUCOSE 101 02/20/2023    BUN 16.3 02/20/2023    CREATININE 0.72 02/20/2023    LABPROT 7.3 02/20/2023    ALBUMIN 3.9 02/20/2023    BILITOT 0.4 02/20/2023    ALKPHOS 115 02/20/2023    AST 14 02/20/2023    ALT 13 02/20/2023    EGFRNORACEVR >60 02/20/2023     Lab Results   Component Value Date    TSH 2.907 02/20/2023     Lab Results   Component Value Date    CHOL 165 02/20/2023    HDL 41 02/20/2023    .00 02/20/2023    TRIG 79 02/20/2023     Lab Results   Component Value Date    COLORUA Yellow 02/20/2023    SGUA 1.023 02/20/2023    PROTEINUA Negative 02/20/2023    GLUCOSEUA Normal 02/20/2023    BILIRUBINUA Negative 02/20/2023    BLOODUA Negative 02/20/2023    WBCUA 0-5 02/20/2023    RBCUA 0-5 02/20/2023    BACTERIA None Seen 02/20/2023    NITRITESUA Negative 02/20/2023    LEUKOCYTESUR 25 (A) 02/20/2023    UROBILINOGEN Normal 02/20/2023     Lab Results   Component Value Date    CREATRANDUR 131.3 (H) 02/20/2023    MICALBCREAT 8.7 02/20/2023     Lab Results   Component Value Date    GHSQFDFH37LZ 35.9 02/20/2023     Lab Results   Component Value Date    HIV Nonreactive 02/01/2018    HEPAIGM Nonreactive 02/01/2018    HEPBCOREM Nonreactive 02/01/2018    HEPCAB Reactive (A) 02/01/2018       Assessment and Plan (including Health Maintenance)     Problem List Items Addressed This Visit          Cardiac/Vascular    Hypertension - Primary    Current Assessment & Plan     BP Readings from Last 3 Encounters:   07/27/23 126/78   02/10/23 (!) 146/90   12/07/22 (!) 144/79      At goal.  Follow a low sodium (less than 2 grams of sodium per day), DASH diet.   Continue amlodipine as prescribed - refilled today.  Monitor blood pressure and report any consistent values greater than 140/90 and keep a log.  Encouraged continued smoking cessation to aid in BP reduction and co-morbidities.   Maintain healthy weight with a BMI goal of <30.   Aerobic  exercise for 150 minutes per week (or 5 days a week for 30 minutes each day).          Relevant Medications    amLODIPine (NORVASC) 5 MG tablet    Other Relevant Orders    CBC Auto Differential    Comprehensive Metabolic Panel    Lipid Panel    Microalbumin/Creatinine Ratio, Urine    Urinalysis, Reflex to Urine Culture       ID    Immunization due    Current Assessment & Plan     Written and verbal consent obtained.  Shingles vaccine was administered.  Ok to take acetaminophen for soreness of arm.          Relevant Orders    Zoster Recombinant Vaccine (Completed)       Immunology/Multi System    Rheumatoid factor positive    Current Assessment & Plan      Latest Reference Range & Units 02/20/23 08:10   Rheumatoid Factor, IgA by FEDERICO <=6 Units <5   Rheumatoid Factor, IgG by FEDERICO <=6 Units 12 (H)   Rheumatoid Factor, IgM by FEDERICO <=6 Units 35 (H)   (H): Data is abnormally high    Referral placed to [facility] to eval and treat.         Relevant Orders    Ambulatory referral/consult to Rheumatology       Endocrine    Vitamin D deficiency    Current Assessment & Plan     Continue vitamin D 1000 iu, refilled today.         Relevant Medications    cholecalciferol, vitamin D3, (VITAMIN D3) 25 mcg (1,000 unit) capsule    Other Relevant Orders    Vitamin D    BMI 24.0-24.9, adult    Current Assessment & Plan     Body mass index is 24.9 kg/m². (At goal).             Palliative Care    Noncompliance with diagnostic testing    Current Assessment & Plan     Has been a no show multiple appts for MMG, bone density scan, venous US, and cardiology clinic.  Office number provided for cardiology clinic.  Centralized scheduling phone number provided to patient to schedule missed tests.             Health Maintenance Due   Topic Date Due    Cervical Cancer Screening  Never done    Mammogram  03/06/2016    COVID-19 Vaccine (2 - Pfizer series) 09/05/2022     Tests to Keep You Healthy    Mammogram: ORDERED BUT NOT  SCHEDULED  Colon Cancer Screening: Met on 3/23/2018  Cervical Cancer Screening: DUE  Last Blood Pressure <= 139/89 (7/27/2023): Yes      Health Maintenance Topics with due status: Not Due       Topic Last Completion Date    TETANUS VACCINE 06/07/2017    Colorectal Cancer Screening 03/23/2018    Influenza Vaccine 02/10/2023    Lipid Panel 02/20/2023       Future Appointments   Date Time Provider Department Center   8/24/2023  1:00 PM CEC[facility] Samaniego [facility]       Follow up in about 4 weeks (around 8/24/2023) for F2F, Follow up, Med check, Lab review, RTC PRN.      Signature:        [facility] Samaniego  [facility]  [address] [facility] [address]    Date of encounter: 7/27/23

## 2023-07-27 NOTE — PATIENT INSTRUCTIONS
Call Centralized Scheduling at [phone] to schedule venous ultrasounds, mammogram, bone density scan.    Call [phone] ask  to transfer you to cardiology clinic to schedule an appt.

## 2023-07-28 ENCOUNTER — TELEPHONE (OUTPATIENT)
Dept: INTERNAL MEDICINE | Facility: CLINIC | Age: 63
End: 2023-07-28
Payer: MEDICAID

## 2023-07-28 PROBLEM — R76.8 RHEUMATOID FACTOR POSITIVE: Status: ACTIVE | Noted: 2023-07-28

## 2023-07-28 PROBLEM — Z91.199 NONCOMPLIANCE WITH DIAGNOSTIC TESTING: Status: ACTIVE | Noted: 2023-07-28

## 2023-07-28 NOTE — ASSESSMENT & PLAN NOTE
Has been a no show multiple appts for MMG, bone density scan, venous US, and cardiology clinic.  Office number provided for cardiology clinic.  Centralized scheduling phone number provided to patient to schedule missed tests.

## 2023-07-28 NOTE — ASSESSMENT & PLAN NOTE
Written and verbal consent obtained.  Shingles vaccine was administered.  Ok to take acetaminophen for soreness of arm.

## 2023-07-28 NOTE — ASSESSMENT & PLAN NOTE
Latest Reference Range & Units 02/20/23 08:10   Rheumatoid Factor, IgA by FEDERICO <=6 Units <5   Rheumatoid Factor, IgG by FEDERICO <=6 Units 12 (H)   Rheumatoid Factor, IgM by FEDERICO <=6 Units 35 (H)   (H): Data is abnormally high    Referral placed to [facility] rheumatology clinic to eval and treat.

## 2023-07-28 NOTE — ASSESSMENT & PLAN NOTE
BP Readings from Last 3 Encounters:   07/27/23 126/78   02/10/23 (!) 146/90   12/07/22 (!) 144/79      At goal.  Follow a low sodium (less than 2 grams of sodium per day), DASH diet.   Continue amlodipine as prescribed - refilled today.  Monitor blood pressure and report any consistent values greater than 140/90 and keep a log.  Encouraged continued smoking cessation to aid in BP reduction and co-morbidities.   Maintain healthy weight with a BMI goal of <30.   Aerobic exercise for 150 minutes per week (or 5 days a week for 30 minutes each day).

## 2023-07-29 ENCOUNTER — HOSPITAL ENCOUNTER (EMERGENCY)
Facility: HOSPITAL | Age: 63
Discharge: HOME | End: 2023-07-29
Attending: INTERNAL MEDICINE
Payer: MEDICAID

## 2023-07-29 VITALS
SYSTOLIC BLOOD PRESSURE: 140 MMHG | WEIGHT: 154.31 LBS | HEART RATE: 80 BPM | HEIGHT: 63 IN | RESPIRATION RATE: 16 BRPM | DIASTOLIC BLOOD PRESSURE: 76 MMHG | BODY MASS INDEX: 27.34 KG/M2 | OXYGEN SATURATION: 99 % | TEMPERATURE: 98 F

## 2023-07-29 DIAGNOSIS — R12 HEARTBURN: Primary | ICD-10-CM

## 2023-07-29 DIAGNOSIS — K21.9 GERD (GASTROESOPHAGEAL REFLUX DISEASE): ICD-10-CM

## 2023-07-29 LAB — TROPONIN I SERPL-MCNC: <0.01 NG/ML (ref 0–0.04)

## 2023-07-29 PROCEDURE — 84484 ASSAY OF TROPONIN QUANT: CPT | Performed by: INTERNAL MEDICINE

## 2023-07-29 PROCEDURE — 99285 EMERGENCY DEPT VISIT HI MDM: CPT | Mod: 25

## 2023-07-29 PROCEDURE — 93005 ELECTROCARDIOGRAM TRACING: CPT

## 2023-07-29 RX ORDER — OMEPRAZOLE 40 MG/1
40 CAPSULE, DELAYED RELEASE ORAL
Qty: 60 CAPSULE | Refills: 0 | Status: SHIPPED | OUTPATIENT
Start: 2023-07-29 | End: 2023-10-26 | Stop reason: SDUPTHER

## 2023-07-29 NOTE — ED PROVIDER NOTES
Encounter Date: 7/29/2023       History     Chief Complaint   Patient presents with    Heartburn     Pt here for heart burn medication refill.      Presents requesting refill in her GERD medication. States R/O her Nexium and her PCP will not refilled. Denies symptoms at this time    The history is provided by the patient.     Review of patient's allergies indicates:   Allergen Reactions    Codeine Itching     Other reaction(s): itching, vomiting  Other reaction(s): itching, vomiting, Not Indicated  Other reaction(s): itching, vomiting     Past Medical History:   Diagnosis Date    Anxiety disorder, unspecified     Anxiety with depression     Gastric ulcer     GERD (gastroesophageal reflux disease)     History of Troy-Parkinson-White (WPW) syndrome     HTN (hypertension)     Irregular heart beat     Lumbar radiculopathy     Major psychotic depression, recurrent     Sciatica     Unspecified viral hepatitis C without hepatic coma     Vitamin D deficiency      Past Surgical History:   Procedure Laterality Date    COLONOSCOPY      TONSILLECTOMY AND ADENOIDECTOMY       Family History   Problem Relation Age of Onset    Hypertension Mother     Diabetes Mother     Epilepsy Father     Diabetes Sister     Anemia Brother      Social History     Tobacco Use    Smoking status: Former     Current packs/day: 0.00    Smokeless tobacco: Never   Substance Use Topics    Alcohol use: Yes    Drug use: Never     Review of Systems   Constitutional:  Negative for fever.   HENT:  Negative for sore throat.    Respiratory:  Negative for shortness of breath.    Cardiovascular:  Positive for chest pain.   Gastrointestinal:  Positive for abdominal pain. Negative for nausea.   Genitourinary:  Negative for dysuria.   Musculoskeletal:  Negative for back pain.   Skin:  Negative for rash.   Neurological:  Negative for weakness.   Hematological:  Does not bruise/bleed easily.   All other systems reviewed and are negative.      Physical Exam     Initial  Vitals [07/29/23 1157]   BP Pulse Resp Temp SpO2   (!) 150/80 84 16 98.2 °F (36.8 °C) 99 %      MAP       --         Physical Exam    Nursing note and vitals reviewed.  Constitutional: She appears well-developed.   HENT:   Head: Normocephalic and atraumatic.   Mouth/Throat: Oropharynx is clear and moist.   Eyes: Conjunctivae and EOM are normal. Pupils are equal, round, and reactive to light.   Neck: Neck supple. No JVD present.   Normal range of motion.  Cardiovascular:  Normal rate, regular rhythm, normal heart sounds and intact distal pulses.           Pulmonary/Chest: Breath sounds normal.   Abdominal: Abdomen is soft. Bowel sounds are normal. She exhibits no distension. There is no abdominal tenderness. There is no rebound and no guarding.   Musculoskeletal:         General: No edema. Normal range of motion.      Cervical back: Normal range of motion and neck supple.     Neurological: She is alert and oriented to person, place, and time. She has normal strength. GCS score is 15. GCS eye subscore is 4. GCS verbal subscore is 5. GCS motor subscore is 6.   Skin: Skin is warm and dry. No rash noted.   Psychiatric: Her behavior is normal.         ED Course   Procedures  Labs Reviewed   TROPONIN I - Normal   EXTRA TUBES    Narrative:     The following orders were created for panel order EXTRA TUBES.  Procedure                               Abnormality         Status                     ---------                               -----------         ------                     Lavender Top Hold[[id number]]                                                             Please view results for these tests on the individual orders.   LAVENDER TOP HOLD     EKG Readings: (Independently Interpreted)   Initial Reading: No STEMI. Rhythm: Normal Sinus Rhythm. Heart Rate: 81. Ectopy: Rare PACs. Conduction: Normal. ST Segments: Normal ST Segments. T Waves: Normal. Axis: Normal. Clinical Impression: Normal Sinus Rhythm     ECG Results               EKG 12-lead (Final result)  Result time 07/29/23 14:51:15      Final result by Interface, Lab In [facility] (07/29/23 14:51:15)                   Narrative:    Test Reason : K21.9,    Vent. Rate : 081 BPM     Atrial Rate : 081 BPM     P-R Int : 114 ms          QRS Dur : 076 ms      QT Int : 382 ms       P-R-T Axes : 067 027 065 degrees     QTc Int : 443 ms    Sinus rhythm with Premature atrial complexes  Possible Left atrial enlargement  Borderline Abnormal ECG  No previous ECGs available  Confirmed by Travis Murrieta MD ([id number]) on 7/29/2023 2:51:03 PM    Referred By: [email or web address]   SELF           Confirmed By:Travis Murrieta MD                                  Imaging Results              X-Ray Chest PA And Lateral (Final result)  Result time 07/29/23 12:46:59      Final result by Cosmo Oglesby MD (07/29/23 12:46:59)                   Impression:      No acute cardiopulmonary process identified.      Electronically signed by: Cosmo Oglesby  Date:    07/29/2023  Time:    12:46               Narrative:    EXAMINATION:  XR CHEST PA AND LATERAL    CLINICAL HISTORY:  Gastro-esophageal reflux disease without esophagitis    TECHNIQUE:  Two views    COMPARISON:  December 5, 2012.    FINDINGS:  Cardiopericardial silhouette is within normal limits. Lungs are without dense focal or segmental consolidation, congestion, pleural effusion or pneumothorax.  Mild scoliosis.                                       Medications - No data to display  Medical Decision Making:   Differential Diagnosis:   Miocardial infarction, pneumothorax, aortic dissection, pulmonary emboli, pneumonia, among others    Independently Interpreted Test(s):   I have ordered and independently interpreted X-rays - see prior notes.  I have ordered and independently interpreted EKG Reading(s) - see prior notes  Clinical Tests:   Lab Tests: Ordered  Radiological Study: Ordered  Medical Tests: Ordered            Attending Attestation:   Physician Attestation  Statement for Resident:  As the supervising MD   Physician Attestation Statement: I have personally seen and examined this patient.   I agree with the above history.  -:   As the supervising MD I agree with the above PE.     As the supervising MD I agree with the above treatment, course, plan, and disposition.    I have reviewed and agree with the residents interpretation of the following: lab data, x-rays and EKG.  I have reviewed the following: old records at this facility and old EKGs.                             Clinical Impression:   Final diagnoses:  [K21.9] GERD (gastroesophageal reflux disease)  [R12] Heartburn (Primary)        ED Disposition Condition    Discharge           ED Prescriptions       Medication Sig Dispense Start Date End Date Auth. Provider    omeprazole (PRILOSEC) 40 MG capsule Take 1 capsule (40 mg total) by mouth 2 (two) times daily before meals. 60 capsule 7/29/2023 8/28/2023 Amelia Elaine MD          Follow-up Information       Follow up With Specialties Details Why Contact Info    [facility] Emergency Medicine Go to  As needed, If symptoms worsen [address] [facility] [id number] [phone]    [facility] Go in 1 month  [address] [facility] [address]  [phone]               Zach Dawson MD  07/29/23 6260

## 2023-08-15 NOTE — TELEPHONE ENCOUNTER
#2 attempt to contact pt at number listed in chart, no answer. Left a VM to call back to [facility].   
#3 attempt to contact pt at number listed in chart, no answer. Left a VM to call back to [facility].   
Attempted to contact pt at number listed in chart, no answer. Left a VM to call back to [facility].   
Please call patient and state to go to the ED or urgent care for symptoms.    Thank you,    MAGDA Nowak 
Pt called and stated that she received the shingles injection yesterday and today she is sick. Pt stated that her symptoms are stomach pain, she is weak, having diaherra, she is feeling nauseated and feeling faint-theron. Pt is also requesting a call back.   
Normal rate, regular rhythm.  Heart sounds S1, S2.  No murmurs, rubs or gallops.

## 2023-10-02 ENCOUNTER — HOSPITAL ENCOUNTER (OUTPATIENT)
Dept: RADIOLOGY | Facility: HOSPITAL | Age: 63
Discharge: HOME | End: 2023-10-02
Payer: MEDICAID

## 2023-10-02 DIAGNOSIS — Z78.0 POST-MENOPAUSAL: ICD-10-CM

## 2023-10-02 PROCEDURE — 77080 DXA BONE DENSITY AXIAL: CPT | Mod: TC

## 2023-10-22 ENCOUNTER — APPOINTMENT (OUTPATIENT)
Dept: LAB | Facility: HOSPITAL | Age: 63
End: 2023-10-22
Payer: MEDICAID

## 2023-10-22 DIAGNOSIS — I10 PRIMARY HYPERTENSION: ICD-10-CM

## 2023-10-22 DIAGNOSIS — E55.9 VITAMIN D DEFICIENCY: ICD-10-CM

## 2023-10-22 LAB
ALBUMIN SERPL-MCNC: 4.1 G/DL (ref 3.4–4.8)
ALBUMIN/GLOB SERPL: 1.2 RATIO (ref 1.1–2)
ALP SERPL-CCNC: 97 UNIT/L (ref 40–150)
ALT SERPL-CCNC: 16 UNIT/L (ref 0–55)
APPEARANCE UR: CLEAR
AST SERPL-CCNC: 18 UNIT/L (ref 5–34)
BACTERIA #/AREA URNS AUTO: ABNORMAL /HPF
BASOPHILS # BLD AUTO: 0.07 X10(3)/MCL
BASOPHILS NFR BLD AUTO: 1.1 %
BILIRUB SERPL-MCNC: 0.5 MG/DL
BILIRUB UR QL STRIP.AUTO: NEGATIVE
BUN SERPL-MCNC: 12.8 MG/DL (ref 9.8–20.1)
CALCIUM SERPL-MCNC: 10.1 MG/DL (ref 8.4–10.2)
CHLORIDE SERPL-SCNC: 109 MMOL/L (ref 98–107)
CHOLEST SERPL-MCNC: 150 MG/DL
CHOLEST/HDLC SERPL: 4 {RATIO} (ref 0–5)
CO2 SERPL-SCNC: 25 MMOL/L (ref 23–31)
COLOR UR AUTO: ABNORMAL
CREAT SERPL-MCNC: 0.82 MG/DL (ref 0.55–1.02)
CREAT UR-MCNC: 79 MG/DL (ref 45–106)
DEPRECATED CALCIDIOL+CALCIFEROL SERPL-MC: 46 NG/ML (ref 30–80)
EOSINOPHIL # BLD AUTO: 0.16 X10(3)/MCL (ref 0–0.9)
EOSINOPHIL NFR BLD AUTO: 2.6 %
ERYTHROCYTE [DISTWIDTH] IN BLOOD BY AUTOMATED COUNT: 12.8 % (ref 11.5–17)
GFR SERPLBLD CREATININE-BSD FMLA CKD-EPI: >60 MLS/MIN/1.73/M2
GLOBULIN SER-MCNC: 3.3 GM/DL (ref 2.4–3.5)
GLUCOSE SERPL-MCNC: 92 MG/DL (ref 82–115)
GLUCOSE UR QL STRIP.AUTO: NORMAL
HCT VFR BLD AUTO: 42.2 % (ref 37–47)
HDLC SERPL-MCNC: 36 MG/DL (ref 35–60)
HGB BLD-MCNC: 14 G/DL (ref 12–16)
HYALINE CASTS #/AREA URNS LPF: ABNORMAL /LPF
IMM GRANULOCYTES # BLD AUTO: 0.01 X10(3)/MCL (ref 0–0.04)
IMM GRANULOCYTES NFR BLD AUTO: 0.2 %
KETONES UR QL STRIP.AUTO: NEGATIVE
LDLC SERPL CALC-MCNC: 93 MG/DL (ref 50–140)
LEUKOCYTE ESTERASE UR QL STRIP.AUTO: 75
LYMPHOCYTES # BLD AUTO: 2.13 X10(3)/MCL (ref 0.6–4.6)
LYMPHOCYTES NFR BLD AUTO: 34.9 %
MCH RBC QN AUTO: 31.2 PG (ref 27–31)
MCHC RBC AUTO-ENTMCNC: 33.2 G/DL (ref 33–36)
MCV RBC AUTO: 94 FL (ref 80–94)
MICROALBUMIN UR-MCNC: 8.3 UG/ML
MICROALBUMIN/CREAT RATIO PNL UR: 10.5 MG/GM CR (ref 0–30)
MONOCYTES # BLD AUTO: 0.49 X10(3)/MCL (ref 0.1–1.3)
MONOCYTES NFR BLD AUTO: 8 %
NEUTROPHILS # BLD AUTO: 3.24 X10(3)/MCL (ref 2.1–9.2)
NEUTROPHILS NFR BLD AUTO: 53.2 %
NITRITE UR QL STRIP.AUTO: NEGATIVE
NRBC BLD AUTO-RTO: 0 %
PH UR STRIP.AUTO: 7 [PH]
PLATELET # BLD AUTO: 146 X10(3)/MCL (ref 130–400)
PMV BLD AUTO: 11.9 FL (ref 7.4–10.4)
POTASSIUM SERPL-SCNC: 4 MMOL/L (ref 3.5–5.1)
PROT SERPL-MCNC: 7.4 GM/DL (ref 5.8–7.6)
PROT UR QL STRIP.AUTO: NEGATIVE
RBC # BLD AUTO: 4.49 X10(6)/MCL (ref 4.2–5.4)
RBC #/AREA URNS AUTO: ABNORMAL /HPF
RBC UR QL AUTO: NEGATIVE
SODIUM SERPL-SCNC: 143 MMOL/L (ref 136–145)
SP GR UR STRIP.AUTO: 1.02 (ref 1–1.03)
SQUAMOUS #/AREA URNS LPF: ABNORMAL /HPF
TRIGL SERPL-MCNC: 104 MG/DL (ref 37–140)
UROBILINOGEN UR STRIP-ACNC: NORMAL
VLDLC SERPL CALC-MCNC: 21 MG/DL
WBC # SPEC AUTO: 6.1 X10(3)/MCL (ref 4.5–11.5)
WBC #/AREA URNS AUTO: ABNORMAL /HPF

## 2023-10-22 PROCEDURE — 82043 UR ALBUMIN QUANTITATIVE: CPT

## 2023-10-22 PROCEDURE — 81001 URINALYSIS AUTO W/SCOPE: CPT

## 2023-10-22 PROCEDURE — 82306 VITAMIN D 25 HYDROXY: CPT

## 2023-10-22 PROCEDURE — 80053 COMPREHEN METABOLIC PANEL: CPT

## 2023-10-22 PROCEDURE — 36415 COLL VENOUS BLD VENIPUNCTURE: CPT

## 2023-10-22 PROCEDURE — 85025 COMPLETE CBC W/AUTO DIFF WBC: CPT

## 2023-10-22 PROCEDURE — 80061 LIPID PANEL: CPT

## 2023-10-23 PROBLEM — Z00.00 WELL ADULT EXAM: Status: ACTIVE | Noted: 2023-10-23

## 2023-10-23 PROBLEM — M85.89 OSTEOPENIA OF MULTIPLE SITES: Status: ACTIVE | Noted: 2023-10-23

## 2023-10-26 ENCOUNTER — OFFICE VISIT (OUTPATIENT)
Dept: INTERNAL MEDICINE | Facility: CLINIC | Age: 63
End: 2023-10-26
Payer: MEDICAID

## 2023-10-26 VITALS
HEIGHT: 62 IN | WEIGHT: 146.63 LBS | SYSTOLIC BLOOD PRESSURE: 156 MMHG | DIASTOLIC BLOOD PRESSURE: 106 MMHG | BODY MASS INDEX: 26.98 KG/M2 | RESPIRATION RATE: 20 BRPM | TEMPERATURE: 98 F | HEART RATE: 63 BPM | OXYGEN SATURATION: 100 %

## 2023-10-26 DIAGNOSIS — Z12.31 BREAST CANCER SCREENING BY MAMMOGRAM: ICD-10-CM

## 2023-10-26 DIAGNOSIS — M54.16 LUMBAR RADICULOPATHY: ICD-10-CM

## 2023-10-26 DIAGNOSIS — I87.2 VENOUS INSUFFICIENCY OF BOTH LOWER EXTREMITIES: ICD-10-CM

## 2023-10-26 DIAGNOSIS — I10 PRIMARY HYPERTENSION: Primary | ICD-10-CM

## 2023-10-26 DIAGNOSIS — Z13.0 SCREENING FOR IRON DEFICIENCY ANEMIA: ICD-10-CM

## 2023-10-26 DIAGNOSIS — Z13.1 SCREENING FOR DIABETES MELLITUS: ICD-10-CM

## 2023-10-26 DIAGNOSIS — Z23 IMMUNIZATION DUE: ICD-10-CM

## 2023-10-26 DIAGNOSIS — Z12.4 CERVICAL CANCER SCREENING: ICD-10-CM

## 2023-10-26 DIAGNOSIS — M85.89 OSTEOPENIA OF MULTIPLE SITES: ICD-10-CM

## 2023-10-26 DIAGNOSIS — K21.00 GASTROESOPHAGEAL REFLUX DISEASE WITH ESOPHAGITIS WITHOUT HEMORRHAGE: ICD-10-CM

## 2023-10-26 DIAGNOSIS — L03.116 CELLULITIS OF LEFT LOWER EXTREMITY: ICD-10-CM

## 2023-10-26 PROCEDURE — 1159F MED LIST DOCD IN RCRD: CPT | Mod: CPTII,,,

## 2023-10-26 PROCEDURE — 3066F PR DOCUMENTATION OF TREATMENT FOR NEPHROPATHY: ICD-10-PCS | Mod: CPTII,,,

## 2023-10-26 PROCEDURE — 3077F PR MOST RECENT SYSTOLIC BLOOD PRESSURE >= 140 MM HG: ICD-10-PCS | Mod: CPTII,,,

## 2023-10-26 PROCEDURE — 87070 CULTURE OTHR SPECIMN AEROBIC: CPT

## 2023-10-26 PROCEDURE — 3044F PR MOST RECENT HEMOGLOBIN A1C LEVEL <7.0%: ICD-10-PCS | Mod: CPTII,,,

## 2023-10-26 PROCEDURE — 99215 OFFICE O/P EST HI 40 MIN: CPT | Mod: PBBFAC

## 2023-10-26 PROCEDURE — 90686 IIV4 VACC NO PRSV 0.5 ML IM: CPT | Mod: PBBFAC

## 2023-10-26 PROCEDURE — 1160F RVW MEDS BY RX/DR IN RCRD: CPT | Mod: CPTII,,,

## 2023-10-26 PROCEDURE — 99214 PR OFFICE/OUTPT VISIT, EST, LEVL IV, 30-39 MIN: ICD-10-PCS | Mod: S$PBB,,,

## 2023-10-26 PROCEDURE — 3061F NEG MICROALBUMINURIA REV: CPT | Mod: CPTII,,,

## 2023-10-26 PROCEDURE — 1160F PR REVIEW ALL MEDS BY PRESCRIBER/CLIN PHARMACIST DOCUMENTED: ICD-10-PCS | Mod: CPTII,,,

## 2023-10-26 PROCEDURE — 1159F PR MEDICATION LIST DOCUMENTED IN MEDICAL RECORD: ICD-10-PCS | Mod: CPTII,,,

## 2023-10-26 PROCEDURE — 3008F PR BODY MASS INDEX (BMI) DOCUMENTED: ICD-10-PCS | Mod: CPTII,,,

## 2023-10-26 PROCEDURE — 99214 OFFICE O/P EST MOD 30 MIN: CPT | Mod: S$PBB,,,

## 2023-10-26 PROCEDURE — 3066F NEPHROPATHY DOC TX: CPT | Mod: CPTII,,,

## 2023-10-26 PROCEDURE — 3061F PR NEG MICROALBUMINURIA RESULT DOCUMENTED/REVIEW: ICD-10-PCS | Mod: CPTII,,,

## 2023-10-26 PROCEDURE — 3080F DIAST BP >= 90 MM HG: CPT | Mod: CPTII,,,

## 2023-10-26 PROCEDURE — 3044F HG A1C LEVEL LT 7.0%: CPT | Mod: CPTII,,,

## 2023-10-26 PROCEDURE — 3008F BODY MASS INDEX DOCD: CPT | Mod: CPTII,,,

## 2023-10-26 PROCEDURE — 90471 IMMUNIZATION ADMIN: CPT | Mod: PBBFAC

## 2023-10-26 PROCEDURE — 3077F SYST BP >= 140 MM HG: CPT | Mod: CPTII,,,

## 2023-10-26 PROCEDURE — 3080F PR MOST RECENT DIASTOLIC BLOOD PRESSURE >= 90 MM HG: ICD-10-PCS | Mod: CPTII,,,

## 2023-10-26 RX ORDER — DICLOFENAC SODIUM 50 MG/1
50 TABLET, DELAYED RELEASE ORAL 2 TIMES DAILY PRN
Qty: 120 TABLET | Refills: 1 | Status: SHIPPED | OUTPATIENT
Start: 2023-10-26 | End: 2023-11-13 | Stop reason: SDUPTHER

## 2023-10-26 RX ORDER — MUPIROCIN 20 MG/G
OINTMENT TOPICAL 2 TIMES DAILY
Qty: 30 G | Refills: 0 | Status: SHIPPED | OUTPATIENT
Start: 2023-10-26 | End: 2023-11-13 | Stop reason: SDUPTHER

## 2023-10-26 RX ORDER — AMLODIPINE BESYLATE 10 MG/1
10 TABLET ORAL DAILY
Qty: 90 TABLET | Refills: 1 | Status: SHIPPED | OUTPATIENT
Start: 2023-10-26 | End: 2023-11-13 | Stop reason: SDUPTHER

## 2023-10-26 RX ORDER — OMEPRAZOLE 40 MG/1
40 CAPSULE, DELAYED RELEASE ORAL
Qty: 180 CAPSULE | Refills: 1 | Status: SHIPPED | OUTPATIENT
Start: 2023-10-26 | End: 2023-11-13 | Stop reason: SDUPTHER

## 2023-10-26 RX ORDER — BACLOFEN 10 MG/1
10 TABLET ORAL 3 TIMES DAILY PRN
Qty: 90 TABLET | Refills: 1 | Status: SHIPPED | OUTPATIENT
Start: 2023-10-26 | End: 2023-11-13 | Stop reason: SDUPTHER

## 2023-10-26 RX ORDER — CALCIUM CARBONATE/VITAMIN D3 600 MG-20
1 TABLET,CHEWABLE ORAL 2 TIMES DAILY
Qty: 180 TABLET | Refills: 1 | Status: SHIPPED | OUTPATIENT
Start: 2023-10-26 | End: 2023-11-13 | Stop reason: SDUPTHER

## 2023-10-26 RX ADMIN — INFLUENZA VIRUS VACCINE 0.5 ML: 15; 15; 15; 15 SUSPENSION INTRAMUSCULAR at 09:10

## 2023-10-26 NOTE — ASSESSMENT & PLAN NOTE
Avoid spicy, acidic, fried food and alcohol.    Eat 2-3 hours before bed.   Avoid tight fitting clothes and chew food thoroughly.    Reduce caffeine intake, avoid soda.    Take meds as prescribed.    Encouraged smoking cessation.   Continue omeprazole - refilled today.

## 2023-10-26 NOTE — ASSESSMENT & PLAN NOTE
Perform back stretches daily.   Avoid activities than increase back pain or stiffness.   Apply heating pad, ice pack, and BioFreeze as needed; alternate every 15-20 minutes.   Massage back to loosen muscles.   Continue tylenol arthritis/NSAID/muscle relaxer as needed.  Baclofen and cyclobenzaprine refilled.

## 2023-10-26 NOTE — PATIENT INSTRUCTIONS
Alfredo Flower,     If you are due for any health screening(s) below please notify me so we can arrange them to be ordered and scheduled. Most healthy patients at your age complete them, but you are free to accept or refuse.     If you can't do it, I'll definitely understand. If you can, I'd certainly appreciate it!    Tests to Keep You Healthy    Mammogram: ORDERED BUT NOT SCHEDULED  Colon Cancer Screening: Met on 3/23/2018  Cervical Cancer Screening: DUE  Last Blood Pressure <= 139/89 (10/26/2023): Yes      Schedule your breast cancer screening today     Breast cancer is the second most common cancer in women,  and the second leading cause of death from cancer. Mammograms can detect breast cancer early, which significantly increases the chances of curing the cancer.       Our records indicate that you may be overdue for breast cancer screening. Cancer screenings save lives, so schedule yours today to stay healthy.     If you recently had a mammogram performed outside of [facility], please let your Health care team know so that they can update your health record.        Your cervical cancer screening is due     Our records indicate that you may be overdue for your screening Pap smear. A Pap smear is an important health screening that can detect abnormal cells that can become cervical cancer. Cervical cancer screenings allow for early diagnosis and increase the likelihood of successful treatment.     The current recommendation for Pap smear screening is every 3-5 years for women at average risk. We encourage you to schedule your appointment with your womens health provider. Many women see a gynecologist for this screening, but some primary care providers also provide Pap screening.     If you recently had your Pap smear screening performed outside of [facility], please let your health care team know so that they can update your health record.      Lets manage your high blood pressure     Your blood  pressure was above 140/90 today during your visit. We recommend that you schedule a nurse visit in two weeks to check your blood pressure and discuss ways to support your health goals.     You can also manage your health and record your blood pressure from the comfort of home by keeping a daily blood pressure log. These results are shared with and reviewed by your provider. Please print this form (Daily Blood Pressure Log) to assist you in keeping track of your blood pressure at home.     Schedule your nurse visit in two weeks to learn more about how to track and manage high blood pressure.    Daily Blood Pressure Log    Name:__________________________________                  Date of Birth:_________    Average Blood Pressure:  __________      Date: Time  (a.m.) Blood  Pressure: Pulse  Rate: Time  (p.m.) Blood  Pressure : Pulse  Rate:   Sample 8:37 127/83 84

## 2023-10-26 NOTE — ASSESSMENT & PLAN NOTE
Pin sized wound with small amount of serosanguinous drainage.  Wound culture obtained.  Instructions: Cleanse wound with antibacterial soap and apply mupirocen twice daily.

## 2023-10-26 NOTE — PROGRESS NOTES
PATIENT NAME: Ching Rojas  : 1960  DATE: 10/26/23  MRN: [id number]          Reason for Visit/Chief Complaint   Back Pain (Chronic), Wound Check, Medication Refill, and Hypertension       History of Present Illness (HPI)     Ching Rojas is a 63 y.o. White female presenting in clinic today for Back Pain (Chronic), Wound Check, Medication Refill, and Hypertension. Previous PCP: Dr. Mg Orozco. PMH: Hep C, hypertension, GERD, vitamin D deficiency, Troy Parkinson White syndrome/irregular heartbeat/palpitations, lumbar DDD with radiculopathy, venous insufficiency, major depression, and anxiety. FHx of BP disorder. She reports anxiety and depression managed at [facility]. She was referred to LUCIANO Green, but missed appt on 3/27/2023. She is scheduled to establish care with [facility] rheumatology clinic on 2024 for positive rheumatoid panel.    All pertinent labs dated 10/22/2023 and diagnostic tests reviewed and discussed with patient.     She has a chronic LLE wound that she reports has been draining recently. Denies fever. She has it wrapped with an ACE wrap. Was previously ordered mupirocin, but she did not  from pharmacy.     She is a former cigarette smoker - quit approximately 10 years ago. Denies alcohol or illicit drug use. Denies chest pain, shortness of breath, cough, headache, dizziness, weakness, abdominal pain, nausea, vomiting, diarrhea, constipation, dysuria, SI, and HI.     Cervical Cancer Screening - Referral to [facility] GYN (2/10/2023). Breast Cancer Screening - Last Mammogram on 3/6/2015. MMG ordered (2/10/2023, reordered on 10/26/2023).  Colon Cancer Screening - Colonoscopy on 3/23/2018. F/U Colonoscopy in 10 years.  Osteoporosis Screening - Last DEXA on 10/2/2023. Results show Osteopenia.   Lung cancer screeing - 2023 - LDCT: [facility].  Eye Exam - Several years. List of local eye doctors provided to patient.  Dental Exam - Several years. List of  local dentists provided to patient.  Vaccinations: Flu - 10/26/2023 / Shingles - 2/10/2023 / Tetanus - 6/7/2017/ Covid - 7/11/2022       Review of Systems     Review of Systems   Constitutional: Negative.    HENT: Negative.     Eyes: Negative.    Respiratory: Negative.     Cardiovascular: Negative.    Gastrointestinal: Negative.    Endocrine: Negative.    Genitourinary: Negative.    Musculoskeletal:  Positive for arthralgias.   Skin: Negative.    Allergic/Immunologic: Negative.    Neurological: Negative.    Hematological:  Bruises/bleeds easily.   Psychiatric/Behavioral:  Negative for self-injury and sleep disturbance.    All other systems reviewed and are negative.      Medical / Social / Family History     Past Medical History:   Diagnosis Date    Anxiety disorder, unspecified     Anxiety with depression     Gastric ulcer     GERD (gastroesophageal reflux disease)     History of Troy-Parkinson-White (WPW) syndrome     HTN (hypertension)     Irregular heart beat     Lumbar radiculopathy     Major psychotic depression, recurrent     Sciatica     Unspecified viral hepatitis C without hepatic coma     Vitamin D deficiency          Past Surgical History:   Procedure Laterality Date    COLONOSCOPY      TONSILLECTOMY AND ADENOIDECTOMY           Social History  Ching Rojas's  reports that she has quit smoking. She has never used smokeless tobacco. She reports current alcohol use. She reports that she does not use drugs.    Family History  Ching Rojas's family history includes Anemia in her brother; Diabetes in her mother and sister; Epilepsy in her father; Hypertension in her mother.    Medications and Allergies     Medications  Current Outpatient Medications   Medication Instructions    amLODIPine (NORVASC) 10 mg, Oral, Daily    baclofen (LIORESAL) 10 mg, Oral, 3 times daily PRN    calcium carbonate-vitamin D3 (CALTRATE 600 PLUS D) 600 mg-20 mcg (800 unit) Chew 1 tablet, Oral, 2 times daily    cholecalciferol (vitamin  "D3) (VITAMIN D3) 2,000 Units, Oral, Daily    cyclobenzaprine (FLEXERIL) 10 mg, Oral, 3 times daily    diclofenac (VOLTAREN) 50 mg, Oral, 2 times daily PRN    mupirocin (BACTROBAN) 2 % ointment Topical (Top), 2 times daily    omeprazole (PRILOSEC) 40 mg, Oral, 2 times daily before meals       Allergies  Review of patient's allergies indicates:   Allergen Reactions    Codeine Itching     Other reaction(s): itching, vomiting  Other reaction(s): itching, vomiting, Not Indicated  Other reaction(s): itching, vomiting       Physical Examination   Visit Vitals  BP (!) 156/106 (BP Location: Left arm, Patient Position: Sitting, BP Method: Small (Manual))   Pulse 63   Temp 98.1 °F (36.7 °C) (Oral)   Resp 20   Ht 5' 2" (1.575 m)   Wt 66.5 kg (146 lb 9.7 oz)   SpO2 100%   BMI 26.81 kg/m²       Physical Exam  Vitals reviewed.   Constitutional:       Appearance: Normal appearance. She is normal weight.   HENT:      Head: Normocephalic and atraumatic.      Right Ear: External ear normal.      Left Ear: External ear normal.      Nose: Nose normal.      Mouth/Throat:      Mouth: Mucous membranes are moist.      Pharynx: Oropharynx is clear.   Eyes:      Extraocular Movements: Extraocular movements intact.      Conjunctiva/sclera: Conjunctivae normal.      Pupils: Pupils are equal, round, and reactive to light.   Cardiovascular:      Rate and Rhythm: Normal rate and regular rhythm.      Pulses: Normal pulses.      Heart sounds: Normal heart sounds.   Pulmonary:      Effort: Pulmonary effort is normal.      Breath sounds: Normal breath sounds.   Abdominal:      General: Bowel sounds are normal.      Palpations: Abdomen is soft.   Musculoskeletal:         General: Normal range of motion.      Cervical back: Normal range of motion and neck supple.   Skin:     General: Skin is warm and dry.      Capillary Refill: Capillary refill takes less than 2 seconds.             Comments: Red lines=varicose veins   Neurological:      General: No " focal deficit present.      Mental Status: She is alert and oriented to person, place, and time.   Psychiatric:         Mood and Affect: Mood normal.         Behavior: Behavior normal.         Thought Content: Thought content normal.         Judgment: Judgment normal.           Results     Lab Results   Component Value Date    WBC 6.10 10/22/2023    RBC 4.49 10/22/2023    HGB 14.0 10/22/2023    HCT 42.2 10/22/2023    MCV 94.0 10/22/2023    MCH 31.2 (H) 10/22/2023    MCHC 33.2 10/22/2023    RDW 12.8 10/22/2023     10/22/2023    MPV 11.9 (H) 10/22/2023      Lab Results   Component Value Date     10/22/2023    K 4.0 10/22/2023    CHLORIDE 109 (H) 10/22/2023    CO2 25 10/22/2023    GLUCOSE 92 10/22/2023    BUN 12.8 10/22/2023    CREATININE 0.82 10/22/2023    LABPROT 7.4 10/22/2023    ALBUMIN 4.1 10/22/2023    BILITOT 0.5 10/22/2023    ALKPHOS 97 10/22/2023    AST 18 10/22/2023    ALT 16 10/22/2023    EGFRNORACEVR >60 10/22/2023     Lab Results   Component Value Date    TSH 2.907 02/20/2023     Lab Results   Component Value Date    CHOL 150 10/22/2023    HDL 36 10/22/2023    LDL 93.00 10/22/2023    TRIG 104 10/22/2023     Lab Results   Component Value Date    COLORUA Light-Yellow 10/22/2023    SGUA 1.020 10/22/2023    PROTEINUA Negative 10/22/2023    GLUCOSEUA Normal 10/22/2023    BILIRUBINUA Negative 10/22/2023    BLOODUA Negative 10/22/2023    WBCUA 0-5 10/22/2023    RBCUA 0-5 10/22/2023    BACTERIA Trace (A) 10/22/2023    NITRITESUA Negative 10/22/2023    LEUKOCYTESUR 75 (A) 10/22/2023    UROBILINOGEN Normal 10/22/2023     Lab Results   Component Value Date    CREATRANDUR 79.0 10/22/2023    MICALBCREAT 10.5 10/22/2023     Lab Results   Component Value Date    WXQKCROF79UT 46.0 10/22/2023     Lab Results   Component Value Date    HIV Nonreactive 02/01/2018    HEPAIGM Nonreactive 02/01/2018    HEPBCOREM Nonreactive 02/01/2018    HEPCAB Reactive (A) 02/01/2018       Assessment and Plan (including Health  Maintenance)     Problem List Items Addressed This Visit          Neuro    Lumbar radiculopathy    Overview     Pt has left leg sciatica. Pt states she is having numbness and tingling down leg.         Current Assessment & Plan     Perform back stretches daily.   Avoid activities than increase back pain or stiffness.   Apply heating pad, ice pack, and BioFreeze as needed; alternate every 15-20 minutes.   Massage back to loosen muscles.   Continue tylenol arthritis/NSAID/muscle relaxer as needed.  Baclofen and cyclobenzaprine refilled.         Relevant Medications    diclofenac (VOLTAREN) 50 MG EC tablet    baclofen (LIORESAL) 10 MG tablet       Cardiac/Vascular    Hypertension - Primary    Current Assessment & Plan     BP Readings from Last 3 Encounters:   10/26/23 (!) 156/106   07/29/23 (!) 140/76   07/27/23 126/78      At goal.  Follow a low sodium (less than 2 grams of sodium per day), DASH diet.   Increase Amlodipine to 10 mg.  RTC in 2 weeks for BP check.  Monitor blood pressure and report any consistent values greater than 140/90 and keep a log.  Encouraged continued smoking cessation to aid in BP reduction and co-morbidities.   Maintain healthy weight with a BMI goal of <30.   Aerobic exercise for 150 minutes per week (or 5 days a week for 30 minutes each day).          Relevant Medications    amLODIPine (NORVASC) 10 MG tablet    Other Relevant Orders    Urinalysis, Reflex to Urine Culture    Microalbumin/Creatinine Ratio, Urine    Lipid Panel    Comprehensive Metabolic Panel    CBC Auto Differential    Venous insufficiency of both lower extremities    Overview     Pt with venous insufficiency of b/l legs with engorged vessels in the legs L>R. Pt c/o peripheral edema and pain in b/l legs from swelling.          Current Assessment & Plan     [facility] vascular US scheduled 12/8/2023, 12/11/2023, and 12/12/2023.  Referral to Dr. Dewayne Rosario to eval and treat.         Relevant Orders    Ambulatory referral/consult to  Cardiovascular Surgery       Renal/    Breast cancer screening by mammogram    Current Assessment & Plan     MMG ordered.          Relevant Orders    Mammo Digital Screening Bilat w/ Pete    Cervical cancer screening    Current Assessment & Plan     Referral to CECIL Lacy at [facility] GYN clinic to eval and treat          Relevant Orders    Ambulatory referral/consult to Gynecology       ID    Cellulitis of left lower extremity    Current Assessment & Plan     Pin sized wound with small amount of serosanguinous drainage.  Wound culture obtained.  Instructions: Cleanse wound with antibacterial soap and apply mupirocen twice daily.          Relevant Medications    mupirocin (BACTROBAN) 2 % ointment    Other Relevant Orders    Wound Culture    Immunization due    Current Assessment & Plan     Written and verbal consent obtained.  Influenza vaccine was administered.  Ok to take acetaminophen for soreness of arm.           Relevant Medications    influenza (QUADRIVALENT PF) vaccine 0.5 mL (Completed)       Endocrine    BMI 26.0-26.9,adult    Current Assessment & Plan     Body mass index is 26.81 kg/m². (At goal).          Relevant Orders    TSH    T4, Free       GI    Gastroesophageal reflux disease    Current Assessment & Plan     Avoid spicy, acidic, fried food and alcohol.    Eat 2-3 hours before bed.   Avoid tight fitting clothes and chew food thoroughly.    Reduce caffeine intake, avoid soda.    Take meds as prescribed.    Encouraged smoking cessation.   Continue omeprazole - refilled today.         Relevant Medications    omeprazole (PRILOSEC) 40 MG capsule       Orthopedic    Osteopenia of multiple sites    Overview     10/2/2023: DEXA  EXAMINATION:  DXA BONE DENSITY AXIAL SKELETON 1 OR MORE SITES     CLINICAL HISTORY:  Asymptomatic menopausal state     COMPARISON:  None     FINDINGS:  L1 to L4 vertebral bone mineral density is equal to 0.910 g/cm squared with a T score of -2.3.     Femoral neck bone mineral  density is equal to 0.743 g/cm squared with a T score of -2.1.     Impression:     1. Osteopenia according to WHO criteria.         Relevant Medications    calcium carbonate-vitamin D3 (CALTRATE 600 PLUS D) 600 mg-20 mcg (800 unit) Chew    Other Relevant Orders    Vitamin D     Other Visit Diagnoses       Screening for iron deficiency anemia        Relevant Orders    Iron and TIBC    Ferritin    Screening for diabetes mellitus        Relevant Orders    Hemoglobin A1C               Health Maintenance Due   Topic Date Due    Cervical Cancer Screening  Never done    Mammogram  03/06/2016    COVID-19 Vaccine (2 - 2023-24 season) 09/01/2023     Tests to Keep You Healthy    Mammogram: ORDERED BUT NOT SCHEDULED  Colon Cancer Screening: Met on 3/23/2018  Cervical Cancer Screening: DUE  Last Blood Pressure <= 139/89 (10/26/2023): NO      Health Maintenance Topics with due status: Not Due       Topic Last Completion Date    TETANUS VACCINE 06/07/2017    Colorectal Cancer Screening 03/23/2018    Hemoglobin A1c (Diabetic Prevention Screening) 02/20/2023    Lipid Panel 10/22/2023       Future Appointments   Date Time Provider Department Center   11/13/2023  8:00 AM [facility]   12/8/2023  8:00 AM [facility]   12/11/2023 10:00 AM [facility]   12/12/2023 10:00 AM [facility] [place] [facility]   5/1/2024  1:00 PM [facility]       Follow up in about 2 weeks (around 11/9/2023) for F2F, Follow up, Med check, HTN, No labs due, RTC PRN.      Signature:        CECIL Samaniego  [facility]  [address] [facility] [id number]    Date of encounter: 10/26/23

## 2023-10-26 NOTE — ASSESSMENT & PLAN NOTE
[facility] vascular US scheduled 12/8/2023, 12/11/2023, and 12/12/2023.  Referral to Dr. Dewayne Rosario to eval and treat.

## 2023-10-26 NOTE — ASSESSMENT & PLAN NOTE
BP Readings from Last 3 Encounters:   10/26/23 (!) 156/106   07/29/23 (!) 140/76   07/27/23 126/78      At goal.  Follow a low sodium (less than 2 grams of sodium per day), DASH diet.   Increase Amlodipine to 10 mg.  RTC in 2 weeks for BP check.  Monitor blood pressure and report any consistent values greater than 140/90 and keep a log.  Encouraged continued smoking cessation to aid in BP reduction and co-morbidities.   Maintain healthy weight with a BMI goal of <30.   Aerobic exercise for 150 minutes per week (or 5 days a week for 30 minutes each day).

## 2023-10-26 NOTE — ASSESSMENT & PLAN NOTE
Written and verbal consent obtained.  Influenza vaccine was administered.  Ok to take acetaminophen for soreness of arm.

## 2023-10-28 LAB — BACTERIA WND CULT: ABNORMAL

## 2023-10-30 ENCOUNTER — TELEPHONE (OUTPATIENT)
Dept: INTERNAL MEDICINE | Facility: CLINIC | Age: 63
End: 2023-10-30
Payer: MEDICAID

## 2023-10-30 DIAGNOSIS — L03.90 MRSA CELLULITIS: Primary | ICD-10-CM

## 2023-10-30 DIAGNOSIS — B95.62 MRSA CELLULITIS: Primary | ICD-10-CM

## 2023-10-30 RX ORDER — CLINDAMYCIN HYDROCHLORIDE 300 MG/1
300 CAPSULE ORAL EVERY 6 HOURS
Qty: 28 CAPSULE | Refills: 0 | Status: SHIPPED | OUTPATIENT
Start: 2023-10-30 | End: 2023-11-13 | Stop reason: ALTCHOICE

## 2023-10-30 NOTE — PROGRESS NOTES
Please inform Ching Rojas of the following:    Wound culture shows staph infection. I am sending an antibiotic (clindamycin) to be taken as prescribed. The mupirocen that was ordered at last office visit, I would like her to use a Q tip and swab the inside of her nostrils twice daily for 7 days.      For any questions, please let me know.    Thank you,    MAGDA Nowak

## 2023-10-30 NOTE — TELEPHONE ENCOUNTER
----- Message from CECIL Samaniego sent at 10/30/2023  6:49 AM CDT -----  Please inform Ching Rojas of the following:    Wound culture shows staph infection. I am sending an antibiotic (clindamycin) to be taken as prescribed. The mupirocen that was ordered at last office visit, I would like her to use a Q tip and swab the inside of her nostrils twice daily for 7 days.      For any questions, please let me know.    Thank you,    CHRIS NowakC

## 2023-10-30 NOTE — TELEPHONE ENCOUNTER
#1 Placed call to patient to inform her of wound culture results and new orders no answer, voicemail not set up.

## 2023-11-02 ENCOUNTER — TELEPHONE (OUTPATIENT)
Dept: INTERNAL MEDICINE | Facility: CLINIC | Age: 63
End: 2023-11-02
Payer: MEDICAID

## 2023-11-02 NOTE — TELEPHONE ENCOUNTER
Spoke to pt and notified of wound culture results. I also notified that rx for clindamycin was sent and to take as prescribed with food. I also notified to use mupirocin in nostrils BID for 7 days. Pt verbalized understanding and will call with any questions or concerns.

## 2023-11-13 ENCOUNTER — OFFICE VISIT (OUTPATIENT)
Dept: INTERNAL MEDICINE | Facility: CLINIC | Age: 63
End: 2023-11-13
Payer: MEDICAID

## 2023-11-13 VITALS
BODY MASS INDEX: 26.5 KG/M2 | WEIGHT: 144 LBS | RESPIRATION RATE: 18 BRPM | HEIGHT: 62 IN | HEART RATE: 91 BPM | SYSTOLIC BLOOD PRESSURE: 138 MMHG | DIASTOLIC BLOOD PRESSURE: 82 MMHG | OXYGEN SATURATION: 98 %

## 2023-11-13 DIAGNOSIS — M85.89 OSTEOPENIA OF MULTIPLE SITES: ICD-10-CM

## 2023-11-13 DIAGNOSIS — M54.16 LUMBAR RADICULOPATHY: ICD-10-CM

## 2023-11-13 DIAGNOSIS — E55.9 VITAMIN D DEFICIENCY: ICD-10-CM

## 2023-11-13 DIAGNOSIS — I10 PRIMARY HYPERTENSION: Primary | ICD-10-CM

## 2023-11-13 DIAGNOSIS — K21.00 GASTROESOPHAGEAL REFLUX DISEASE WITH ESOPHAGITIS WITHOUT HEMORRHAGE: ICD-10-CM

## 2023-11-13 DIAGNOSIS — L03.116 CELLULITIS OF LEFT LOWER EXTREMITY: ICD-10-CM

## 2023-11-13 DIAGNOSIS — J02.9 THROAT SORENESS: ICD-10-CM

## 2023-11-13 PROCEDURE — 3079F PR MOST RECENT DIASTOLIC BLOOD PRESSURE 80-89 MM HG: ICD-10-PCS | Mod: CPTII,,,

## 2023-11-13 PROCEDURE — 3061F PR NEG MICROALBUMINURIA RESULT DOCUMENTED/REVIEW: ICD-10-PCS | Mod: CPTII,,,

## 2023-11-13 PROCEDURE — 3044F HG A1C LEVEL LT 7.0%: CPT | Mod: CPTII,,,

## 2023-11-13 PROCEDURE — 99214 PR OFFICE/OUTPT VISIT, EST, LEVL IV, 30-39 MIN: ICD-10-PCS | Mod: S$PBB,,,

## 2023-11-13 PROCEDURE — 1159F MED LIST DOCD IN RCRD: CPT | Mod: CPTII,,,

## 2023-11-13 PROCEDURE — 1159F PR MEDICATION LIST DOCUMENTED IN MEDICAL RECORD: ICD-10-PCS | Mod: CPTII,,,

## 2023-11-13 PROCEDURE — 1160F RVW MEDS BY RX/DR IN RCRD: CPT | Mod: CPTII,,,

## 2023-11-13 PROCEDURE — 1160F PR REVIEW ALL MEDS BY PRESCRIBER/CLIN PHARMACIST DOCUMENTED: ICD-10-PCS | Mod: CPTII,,,

## 2023-11-13 PROCEDURE — 99214 OFFICE O/P EST MOD 30 MIN: CPT | Mod: S$PBB,,,

## 2023-11-13 PROCEDURE — 3044F PR MOST RECENT HEMOGLOBIN A1C LEVEL <7.0%: ICD-10-PCS | Mod: CPTII,,,

## 2023-11-13 PROCEDURE — 3008F BODY MASS INDEX DOCD: CPT | Mod: CPTII,,,

## 2023-11-13 PROCEDURE — 3066F NEPHROPATHY DOC TX: CPT | Mod: CPTII,,,

## 2023-11-13 PROCEDURE — 3079F DIAST BP 80-89 MM HG: CPT | Mod: CPTII,,,

## 2023-11-13 PROCEDURE — 3061F NEG MICROALBUMINURIA REV: CPT | Mod: CPTII,,,

## 2023-11-13 PROCEDURE — 3075F PR MOST RECENT SYSTOLIC BLOOD PRESS GE 130-139MM HG: ICD-10-PCS | Mod: CPTII,,,

## 2023-11-13 PROCEDURE — 99214 OFFICE O/P EST MOD 30 MIN: CPT | Mod: PBBFAC

## 2023-11-13 PROCEDURE — 3008F PR BODY MASS INDEX (BMI) DOCUMENTED: ICD-10-PCS | Mod: CPTII,,,

## 2023-11-13 PROCEDURE — 3066F PR DOCUMENTATION OF TREATMENT FOR NEPHROPATHY: ICD-10-PCS | Mod: CPTII,,,

## 2023-11-13 PROCEDURE — 3075F SYST BP GE 130 - 139MM HG: CPT | Mod: CPTII,,,

## 2023-11-13 RX ORDER — BACLOFEN 10 MG/1
10 TABLET ORAL 3 TIMES DAILY PRN
Qty: 90 TABLET | Refills: 1 | Status: SHIPPED | OUTPATIENT
Start: 2023-11-13 | End: 2024-11-12

## 2023-11-13 RX ORDER — DICLOFENAC SODIUM 50 MG/1
50 TABLET, DELAYED RELEASE ORAL 2 TIMES DAILY PRN
Qty: 120 TABLET | Refills: 1 | Status: SHIPPED | OUTPATIENT
Start: 2023-11-13 | End: 2024-11-12

## 2023-11-13 RX ORDER — CALCIUM CARBONATE/VITAMIN D3 600 MG-20
1 TABLET,CHEWABLE ORAL 2 TIMES DAILY
Qty: 180 TABLET | Refills: 1 | Status: SHIPPED | OUTPATIENT
Start: 2023-11-13 | End: 2024-11-12

## 2023-11-13 RX ORDER — VIT C/E/ZN/COPPR/LUTEIN/ZEAXAN 250MG-90MG
2000 CAPSULE ORAL DAILY
Qty: 180 CAPSULE | Refills: 1 | Status: SHIPPED | OUTPATIENT
Start: 2023-11-13

## 2023-11-13 RX ORDER — OMEPRAZOLE 40 MG/1
40 CAPSULE, DELAYED RELEASE ORAL
Qty: 180 CAPSULE | Refills: 1 | Status: SHIPPED | OUTPATIENT
Start: 2023-11-13 | End: 2023-11-28 | Stop reason: SDUPTHER

## 2023-11-13 RX ORDER — MUPIROCIN 20 MG/G
OINTMENT TOPICAL 2 TIMES DAILY
Qty: 30 G | Refills: 0 | Status: SHIPPED | OUTPATIENT
Start: 2023-11-13 | End: 2023-12-13

## 2023-11-13 RX ORDER — AMLODIPINE BESYLATE 10 MG/1
10 TABLET ORAL DAILY
Qty: 90 TABLET | Refills: 1 | Status: SHIPPED | OUTPATIENT
Start: 2023-11-13 | End: 2024-11-12

## 2023-11-13 NOTE — PATIENT INSTRUCTIONS
REMINDER: Please complete labs within 1 week of appointment.   Please complete satisfaction survey when received. Thank you.    Alfredo Flower,     If you are due for any health screening(s) below please notify me so we can arrange them to be ordered and scheduled. Most healthy patients at your age complete them, but you are free to accept or refuse.     If you can't do it, I'll definitely understand. If you can, I'd certainly appreciate it!    Tests to Keep You Healthy    Mammogram: ORDERED BUT NOT SCHEDULED  Colon Cancer Screening: Met on 3/23/2018  Cervical Cancer Screening: DUE  Last Blood Pressure <= 139/89 (11/13/2023): NO      Schedule your breast cancer screening today     Breast cancer is the second most common cancer in women,  and the second leading cause of death from cancer. Mammograms can detect breast cancer early, which significantly increases the chances of curing the cancer.       Our records indicate that you may be overdue for breast cancer screening. Cancer screenings save lives, so schedule yours today to stay healthy.     If you recently had a mammogram performed outside of [facility], please let your Health care team know so that they can update your health record.        Your cervical cancer screening is due     Our records indicate that you may be overdue for your screening Pap smear. A Pap smear is an important health screening that can detect abnormal cells that can become cervical cancer. Cervical cancer screenings allow for early diagnosis and increase the likelihood of successful treatment.     The current recommendation for Pap smear screening is every 3-5 years for women at average risk. We encourage you to schedule your appointment with your womens health provider. Many women see a gynecologist for this screening, but some primary care providers also provide Pap screening.     If you recently had your Pap smear screening performed outside of [facility], please let  your health care team know so that they can update your health record.

## 2023-11-13 NOTE — PROGRESS NOTES
PATIENT NAME: Ching Rojas  : 1960  DATE: 23  MRN: [id number]          Reason for Visit/Chief Complaint   Hypertension       History of Present Illness (HPI)     Ching Rojas is a 63 y.o. White female presenting in clinic today for Hypertension. PMH: Hep C, hypertension, GERD, vitamin D deficiency, Troy Parkinson White syndrome/irregular heartbeat/palpitations, lumbar DDD with radiculopathy, venous insufficiency, major depression, and anxiety. FHx of BP disorder. She reports anxiety and depression managed at [facility]. She was referred to LUCIAN[facility], but missed appt on 3/27/2023. She is scheduled to establish care with [facility] rheumatology clinic on 2024 for positive rheumatoid panel. Was previously referred to Dr. Dewayne Rosario, but states she has not received an appt.      Hypertension: Patient here for follow-up of elevated blood pressure. Today, BP-138/82 - at goal. She is not exercising and is not adherent to low salt diet.  Does not check BP at home. Cardiac symptoms lower extremity edema and varicose veins . Patient denies chest pain, exertional chest pressure/discomfort, and near-syncope.  Cardiovascular risk factors: hypertension. Use of agents associated with hypertension: NSAIDS. History of target organ damage: none.      Patient states she choked on a piece of meat 3 nights ago and is now experiencing throat soreness. She denies dysphagia or SOB.     Patient requests all meds to be sent to [facility] pharmacy. She is a former cigarette smoker - quit approximately 10 years ago. Denies alcohol or illicit drug use. Denies chest pain, shortness of breath, cough, headache, dizziness, weakness, abdominal pain, nausea, vomiting, diarrhea, constipation, dysuria, SI, and HI.       Review of Systems     Review of Systems   Constitutional: Negative.    HENT:  Positive for sore throat. Negative for trouble swallowing.    Eyes: Negative.    Respiratory: Negative.     Cardiovascular:  Negative.    Gastrointestinal: Negative.    Endocrine: Negative.    Genitourinary: Negative.    Musculoskeletal:  Positive for arthralgias.   Skin: Negative.    Allergic/Immunologic: Negative.    Neurological: Negative.    Hematological:  Bruises/bleeds easily.   Psychiatric/Behavioral:  Negative for self-injury and sleep disturbance.    All other systems reviewed and are negative.      Medical / Social / Family History     Past Medical History:   Diagnosis Date    Anxiety disorder, unspecified     Anxiety with depression     Gastric ulcer     GERD (gastroesophageal reflux disease)     History of Troy-Parkinson-White (WPW) syndrome     HTN (hypertension)     Irregular heart beat     Lumbar radiculopathy     Major psychotic depression, recurrent     Sciatica     Unspecified viral hepatitis C without hepatic coma     Vitamin D deficiency          Past Surgical History:   Procedure Laterality Date    COLONOSCOPY      TONSILLECTOMY AND ADENOIDECTOMY           Social History  Ching Rojas's  reports that she has quit smoking. She has never used smokeless tobacco. She reports current alcohol use. She reports that she does not use drugs.    Family History  Ching Rojas's family history includes Anemia in her brother; Diabetes in her mother and sister; Epilepsy in her father; Hypertension in her mother.    Medications and Allergies     Medications  Current Outpatient Medications   Medication Instructions    amLODIPine (NORVASC) 10 mg, Oral, Daily    baclofen (LIORESAL) 10 mg, Oral, 3 times daily PRN    calcium carbonate-vitamin D3 (CALTRATE 600 PLUS D) 600 mg-20 mcg (800 unit) Chew 1 tablet, Oral, 2 times daily    cholecalciferol (vitamin D3) (VITAMIN D3) 2,000 Units, Oral, Daily    diclofenac (VOLTAREN) 50 mg, Oral, 2 times daily PRN    mupirocin (BACTROBAN) 2 % ointment Topical (Top), 2 times daily    omeprazole (PRILOSEC) 40 mg, Oral, 2 times daily before meals    phenoL (CHLORASEPTIC) 1.4 % SprA Mucous Membrane, Every  "2 hours PRN       Allergies  Review of patient's allergies indicates:   Allergen Reactions    Codeine Itching     Other reaction(s): itching, vomiting  Other reaction(s): itching, vomiting, Not Indicated  Other reaction(s): itching, vomiting       Physical Examination   Visit Vitals  /82 (BP Location: Right arm, Patient Position: Sitting, BP Method: Large (Manual))   Pulse 91   Resp 18   Ht 5' 2" (1.575 m)   Wt 65.3 kg (144 lb)   SpO2 98%   BMI 26.34 kg/m²     Physical Exam  Vitals reviewed.   Constitutional:       Appearance: Normal appearance. She is normal weight.   HENT:      Head: Normocephalic and atraumatic.      Right Ear: External ear normal.      Left Ear: External ear normal.      Nose: Nose normal.      Mouth/Throat:      Mouth: Mucous membranes are moist.      Pharynx: Oropharynx is clear.   Eyes:      Extraocular Movements: Extraocular movements intact.      Conjunctiva/sclera: Conjunctivae normal.      Pupils: Pupils are equal, round, and reactive to light.   Cardiovascular:      Rate and Rhythm: Normal rate and regular rhythm.      Pulses: Normal pulses.      Heart sounds: Normal heart sounds.   Pulmonary:      Effort: Pulmonary effort is normal.      Breath sounds: Normal breath sounds.   Abdominal:      General: Bowel sounds are normal.      Palpations: Abdomen is soft.   Musculoskeletal:         General: Normal range of motion.      Cervical back: Normal range of motion and neck supple.   Skin:     General: Skin is warm and dry.      Capillary Refill: Capillary refill takes less than 2 seconds.             Comments: Red lines=varicose veins   Neurological:      General: No focal deficit present.      Mental Status: She is alert and oriented to person, place, and time.   Psychiatric:         Mood and Affect: Mood normal.         Behavior: Behavior normal.         Thought Content: Thought content normal.         Judgment: Judgment normal.           Results     Lab Results   Component Value " Date    WBC 6.10 10/22/2023    RBC 4.49 10/22/2023    HGB 14.0 10/22/2023    HCT 42.2 10/22/2023    MCV 94.0 10/22/2023    MCH 31.2 (H) 10/22/2023    MCHC 33.2 10/22/2023    RDW 12.8 10/22/2023     10/22/2023    MPV 11.9 (H) 10/22/2023      Lab Results   Component Value Date     10/22/2023    K 4.0 10/22/2023    CHLORIDE 109 (H) 10/22/2023    CO2 25 10/22/2023    GLUCOSE 92 10/22/2023    BUN 12.8 10/22/2023    CREATININE 0.82 10/22/2023    LABPROT 7.4 10/22/2023    ALBUMIN 4.1 10/22/2023    BILITOT 0.5 10/22/2023    ALKPHOS 97 10/22/2023    AST 18 10/22/2023    ALT 16 10/22/2023    EGFRNORACEVR >60 10/22/2023     Lab Results   Component Value Date    TSH 2.907 02/20/2023     Lab Results   Component Value Date    CHOL 150 10/22/2023    HDL 36 10/22/2023    LDL 93.00 10/22/2023    TRIG 104 10/22/2023     Lab Results   Component Value Date    COLORUA Light-Yellow 10/22/2023    SGUA 1.020 10/22/2023    PROTEINUA Negative 10/22/2023    GLUCOSEUA Normal 10/22/2023    BILIRUBINUA Negative 10/22/2023    BLOODUA Negative 10/22/2023    WBCUA 0-5 10/22/2023    RBCUA 0-5 10/22/2023    BACTERIA Trace (A) 10/22/2023    NITRITESUA Negative 10/22/2023    LEUKOCYTESUR 75 (A) 10/22/2023    UROBILINOGEN Normal 10/22/2023     Lab Results   Component Value Date    CREATRANDUR 79.0 10/22/2023    MICALBCREAT 10.5 10/22/2023     Lab Results   Component Value Date    AIQKIAQD74IX 46.0 10/22/2023     Lab Results   Component Value Date    HIV Nonreactive 02/01/2018    HEPAIGM Nonreactive 02/01/2018    HEPBCOREM Nonreactive 02/01/2018    HEPCAB Reactive (A) 02/01/2018       Assessment and Plan (including Health Maintenance)     Problem List Items Addressed This Visit          Neuro    Lumbar radiculopathy    Overview     Pt has left leg sciatica. Pt states she is having numbness and tingling down leg.         Relevant Medications    baclofen (LIORESAL) 10 MG tablet    diclofenac (VOLTAREN) 50 MG EC tablet       ENT    Throat  soreness    Current Assessment & Plan     Rx chloraseptic spray.  Salt water gargles twice daily as needed for soreness.          Relevant Medications    phenoL (CHLORASEPTIC) 1.4 % SprA       Cardiac/Vascular    Hypertension - Primary    Current Assessment & Plan     BP Readings from Last 3 Encounters:   11/13/23 138/82   10/26/23 (!) 156/106   07/29/23 (!) 140/76      At goal.  Follow a low sodium (less than 2 grams of sodium per day), DASH diet.   Continue Amlodipine as prescribed.   Monitor blood pressure and report any consistent values greater than 140/90 and keep a log.  Encouraged continued smoking cessation to aid in BP reduction and co-morbidities.   Maintain healthy weight with a BMI goal of <30.   Aerobic exercise for 150 minutes per week (or 5 days a week for 30 minutes each day).          Relevant Medications    amLODIPine (NORVASC) 10 MG tablet       ID    Cellulitis of left lower extremity    Relevant Medications    mupirocin (BACTROBAN) 2 % ointment       Endocrine    Vitamin D deficiency    Relevant Medications    cholecalciferol, vitamin D3, (VITAMIN D3) 25 mcg (1,000 unit) capsule    BMI 26.0-26.9,adult    Current Assessment & Plan     Body mass index is 26.34 kg/m². (At goal).             GI    Gastroesophageal reflux disease    Relevant Medications    omeprazole (PRILOSEC) 40 MG capsule       Orthopedic    Osteopenia of multiple sites    Overview     10/2/2023: DEXA  EXAMINATION:  DXA BONE DENSITY AXIAL SKELETON 1 OR MORE SITES     CLINICAL HISTORY:  Asymptomatic menopausal state     COMPARISON:  None     FINDINGS:  L1 to L4 vertebral bone mineral density is equal to 0.910 g/cm squared with a T score of -2.3.     Femoral neck bone mineral density is equal to 0.743 g/cm squared with a T score of -2.1.     Impression:     1. Osteopenia according to WHO criteria.         Relevant Medications    calcium carbonate-vitamin D3 (CALTRATE 600 PLUS D) 600 mg-20 mcg (800 unit) [employer]  Maintenance Due   Topic Date Due    Cervical Cancer Screening  Never done    Mammogram  03/06/2016    RSV Vaccine (Age 60+) (1 - 1-dose 60+ series) Never done    COVID-19 Vaccine (2 - 2023-24 season) 09/01/2023     Tests to Keep You Healthy    Mammogram: ORDERED BUT NOT SCHEDULED  Colon Cancer Screening: Met on 3/23/2018  Cervical Cancer Screening: DUE  Last Blood Pressure <= 139/89 (11/13/2023): Yes      Health Maintenance Topics with due status: Not Due       Topic Last Completion Date    TETANUS VACCINE 06/07/2017    Colorectal Cancer Screening 03/23/2018    Hemoglobin A1c (Diabetic Prevention Screening) 02/20/2023    Lipid Panel 10/22/2023       Future Appointments   Date Time Provider Department Center   12/8/2023  8:00 AM [facility] [place]   12/11/2023 10:00 AM [facility] [place]   12/12/2023 10:00 AM UL [facility] [place] [facility]   3/14/2024  9:45 AM [facility]   5/1/2024  1:00 PM [facility]        Follow up in about 4 months (around 3/13/2024) for F2F, Follow up, Med check, Lab review, RTC PRN.          Signature:        CECIL Samaniego  [facility]  [address] [address]    Date of encounter: 11/13/23

## 2023-11-13 NOTE — ASSESSMENT & PLAN NOTE
BP Readings from Last 3 Encounters:   11/13/23 138/82   10/26/23 (!) 156/106   07/29/23 (!) 140/76      At goal.  Follow a low sodium (less than 2 grams of sodium per day), DASH diet.   Continue Amlodipine as prescribed.   Monitor blood pressure and report any consistent values greater than 140/90 and keep a log.  Encouraged continued smoking cessation to aid in BP reduction and co-morbidities.   Maintain healthy weight with a BMI goal of <30.   Aerobic exercise for 150 minutes per week (or 5 days a week for 30 minutes each day).

## 2023-11-28 ENCOUNTER — HOSPITAL ENCOUNTER (EMERGENCY)
Facility: HOSPITAL | Age: 63
Discharge: HOME | End: 2023-11-28
Attending: EMERGENCY MEDICINE
Payer: MEDICAID

## 2023-11-28 VITALS
BODY MASS INDEX: 22.08 KG/M2 | RESPIRATION RATE: 16 BRPM | SYSTOLIC BLOOD PRESSURE: 165 MMHG | WEIGHT: 137.38 LBS | DIASTOLIC BLOOD PRESSURE: 84 MMHG | TEMPERATURE: 98 F | OXYGEN SATURATION: 99 % | HEIGHT: 66 IN | HEART RATE: 79 BPM

## 2023-11-28 DIAGNOSIS — K21.9 CHRONIC GERD: ICD-10-CM

## 2023-11-28 DIAGNOSIS — R10.13 EPIGASTRIC ABDOMINAL PAIN: Primary | ICD-10-CM

## 2023-11-28 DIAGNOSIS — K21.00 GASTROESOPHAGEAL REFLUX DISEASE WITH ESOPHAGITIS WITHOUT HEMORRHAGE: ICD-10-CM

## 2023-11-28 PROCEDURE — 25000003 PHARM REV CODE 250: Performed by: PHYSICIAN ASSISTANT

## 2023-11-28 PROCEDURE — 93005 ELECTROCARDIOGRAM TRACING: CPT

## 2023-11-28 PROCEDURE — 99283 EMERGENCY DEPT VISIT LOW MDM: CPT

## 2023-11-28 RX ORDER — MAG HYDROX/ALUMINUM HYD/SIMETH 200-200-20
30 SUSPENSION, ORAL (FINAL DOSE FORM) ORAL ONCE
Status: COMPLETED | OUTPATIENT
Start: 2023-11-28 | End: 2023-11-28

## 2023-11-28 RX ORDER — OMEPRAZOLE 40 MG/1
40 CAPSULE, DELAYED RELEASE ORAL
Qty: 60 CAPSULE | Refills: 3 | Status: SHIPPED | OUTPATIENT
Start: 2023-11-28

## 2023-11-28 RX ADMIN — ALUMINUM HYDROXIDE, MAGNESIUM HYDROXIDE, AND SIMETHICONE 30 ML: 200; 200; 20 SUSPENSION ORAL at 11:11

## 2023-11-28 NOTE — DISCHARGE INSTRUCTIONS
Take medication as prescribed.  Take the 1st dose 20-30 minutes prior to eating in the morning and the 2nd dose 20-30 minutes prior to supper.  Referral sent to GI clinic, they will call you to schedule an appointment.  If you do not receive a phone call within 1-2 weeks you can call them.  GI clinic's number is on these discharge papers.  Follow up with the primary care provider within 2-3 days.

## 2023-11-28 NOTE — ED PROVIDER NOTES
Encounter Date: 11/28/2023       History     Chief Complaint   Patient presents with    Gastroesophageal Reflux     Patient reporting GERD symptoms x 2 weeks; has been off of her PPI x 2 months due to insurance coverage. Reports 9/10 epigastric burning pain     63-year-old female with a history of GERD, presents to the emergency department with reports of moderate acid reflux symptoms that is worsening over the last 2 weeks.  She states she is been off of her PPI medication for 2 months due to insurance coverage.  She reports having epigastric and esophageal burning and rates discomfort at 9/10.  Patient states the reflux so bad that she was not able to eat much due to symptoms which has caused her to lose about 10 lb in 2 weeks.  EKG was done in triage with normal sinus rhythm.  I recommended we do a cardiac workup to rule out cardiac etiology however patient declined and states this is her typical GERD symptoms.  She was just here requesting GI referral and refill medication.  She denies chest pain, shortness of breath, dizziness, nausea, vomiting, dizziness.    The history is provided by the patient. No  was used.     Review of patient's allergies indicates:   Allergen Reactions    Codeine Itching     Other reaction(s): itching, vomiting  Other reaction(s): itching, vomiting, Not Indicated  Other reaction(s): itching, vomiting     Past Medical History:   Diagnosis Date    Anxiety disorder, unspecified     Anxiety with depression     Gastric ulcer     GERD (gastroesophageal reflux disease)     History of Troy-Parkinson-White (WPW) syndrome     HTN (hypertension)     Irregular heart beat     Lumbar radiculopathy     Major psychotic depression, recurrent     Sciatica     Unspecified viral hepatitis C without hepatic coma     Vitamin D deficiency      Past Surgical History:   Procedure Laterality Date    COLONOSCOPY      TONSILLECTOMY AND ADENOIDECTOMY       Family History   Problem Relation Age  of Onset    Hypertension Mother     Diabetes Mother     Epilepsy Father     Diabetes Sister     Anemia Brother      Social History     Tobacco Use    Smoking status: Former    Smokeless tobacco: Never   Substance Use Topics    Alcohol use: Yes    Drug use: Yes     Types: Marijuana     Review of Systems   Constitutional:  Negative for chills and fever.   HENT:  Negative for congestion and sore throat.    Eyes: Negative.    Respiratory:  Negative for cough, chest tightness and shortness of breath.    Cardiovascular:  Negative for chest pain and palpitations.   Gastrointestinal:  Negative for abdominal pain, diarrhea, nausea and vomiting.   Genitourinary:  Negative for decreased urine volume and dysuria.   Musculoskeletal:  Negative for back pain and myalgias.   Skin:  Negative for rash and wound.   Neurological:  Negative for dizziness, weakness, light-headedness, numbness and headaches.       Physical Exam     Initial Vitals [11/28/23 1101]   BP Pulse Resp Temp SpO2   (!) 165/84 79 16 97.9 °F (36.6 °C) 99 %      MAP       --         Physical Exam    Nursing note and vitals reviewed.  Constitutional: She appears well-developed and well-nourished.   HENT:   Nose: Nose normal.   Mouth/Throat: Oropharynx is clear and moist.   Eyes: Conjunctivae are normal.   Neck: Neck supple.   Normal range of motion.  Cardiovascular:  Normal rate, regular rhythm, normal heart sounds and intact distal pulses.           Pulmonary/Chest: Breath sounds normal.   Abdominal: Abdomen is soft. Bowel sounds are normal. There is no abdominal tenderness. There is no rebound and no guarding.   Musculoskeletal:         General: Normal range of motion.      Cervical back: Normal range of motion and neck supple.     Neurological: She is alert.   Skin: Skin is warm. Capillary refill takes less than 2 seconds.         ED Course   Procedures  Labs Reviewed - No data to display  EKG Readings: (Independently Interpreted)   Initial Reading: No STEMI.  Rhythm: Normal Sinus Rhythm. Heart Rate: 71. Axis: Normal.     ECG Results              EKG 12-lead (In process)  Result time 11/28/23 11:13:29      In process by Interface, Lab In [facility] (11/28/23 11:13:29)                   Narrative:    Test Reason : R10.13,    Vent. Rate : 071 BPM     Atrial Rate : 071 BPM     P-R Int : 120 ms          QRS Dur : 072 ms      QT Int : 408 ms       P-R-T Axes : 070 011 028 degrees     QTc Int : 443 ms    Normal sinus rhythm  Normal ECG  When compared with ECG of 29-JUL-2023 13:01,  Premature supraventricular complexes are no longer Present    Referred By: [email or web address]   SELF           Confirmed By:                                   Imaging Results    None          Medications   aluminum-magnesium hydroxide-simethicone 200-200-20 mg/5 mL suspension 30 mL (30 mLs Oral Given 11/28/23 1128)     Medical Decision Making  63-year-old female with a history of GERD, presents to the emergency department with reports of moderate acid reflux symptoms that is worsening over the last 2 weeks.  She states she is been off of her PPI medication for 2 months due to insurance coverage.  She reports having epigastric and esophageal burning and rates discomfort at 9/10.  Patient states the reflux so bad that she was not able to eat much due to symptoms which has caused her to lose about 10 lb in 2 weeks.  EKG was done in triage with normal sinus rhythm.  I recommended we do a cardiac workup to rule out cardiac etiology however patient declined and states this is her typical GERD symptoms.  She was just here requesting GI referral and refill medication.  She denies chest pain, shortness of breath, dizziness, nausea, vomiting, dizziness.    DDx:  GERD, esophagitis, peptic ulcer, MI    Normal sinus rhythm on EKG.  Patient declined cardiac workup.  Referral sent to GI and omeprazole refilled.  GI cocktail given in the ED.    The patient is resting comfortably and in no acute distress.  She states that her  symptoms have improved after treatment in Emergency Department. I personally discussed her test results and treatment plan.  Gave strict ED precautions, discussed specific conditions for return to the emergency department and importance of follow up with her primary care provider.  Patient voices understanding and agrees to the plan discussed. All patients' questions have been answered at this time.   She has remained hemodynamically stable throughout entire stay in ED and is stable for discharge home.         Amount and/or Complexity of Data Reviewed  ECG/medicine tests: ordered and independent interpretation performed. Decision-making details documented in ED Course.    Risk  OTC drugs.  Prescription drug management.                                      Clinical Impression:  Final diagnoses:  [R10.13] Epigastric abdominal pain (Primary)  [K21.9] Chronic GERD          ED Disposition Condition    Discharge Stable          ED Prescriptions       Medication Sig Dispense Start Date End Date Auth. Provider    omeprazole (PRILOSEC) 40 MG capsule Take 1 capsule (40 mg total) by mouth 2 (two) times daily before meals. 60 capsule 11/28/2023 -- Nina Winchester PA          Follow-up Information       Follow up With Specialties Details Why Contact Info Additional Information    [facility] Emergency Medicine  As needed, If symptoms worsen [address] [address] [phone]     Alta Barriga, FNP Family Medicine Schedule an appointment as soon as possible for a visit in 2 days  [facility] [id number] [phone]       [facility] Gastroenterology  they will call to schedule apt [address] [address] [phone] Entrance 1             Nina Winchester PA  11/28/23 1212

## 2023-12-07 DIAGNOSIS — I83.813 VARICOSE VEINS OF BOTH LOWER EXTREMITIES WITH PAIN: Primary | ICD-10-CM

## 2023-12-12 ENCOUNTER — HOSPITAL ENCOUNTER (OUTPATIENT)
Dept: RADIOLOGY | Facility: HOSPITAL | Age: 63
Discharge: HOME | End: 2023-12-12
Payer: MEDICAID

## 2023-12-12 DIAGNOSIS — I83.813 VARICOSE VEINS OF BOTH LOWER EXTREMITIES WITH PAIN: ICD-10-CM

## 2023-12-12 LAB
RIGHT GREAT SAPHENOUS DISTAL THIGH DIA: 0.27 CM
RIGHT GREAT SAPHENOUS DISTAL THIGH REFLUX: 0 MS
RIGHT GREAT SAPHENOUS JUNCTION DIA: 0.77 CM
RIGHT GREAT SAPHENOUS JUNCTION REFLUX: 1360 MS
RIGHT GREAT SAPHENOUS KNEE DIA: 0.28 CM
RIGHT GREAT SAPHENOUS KNEE REFLUX: 1370 MS
RIGHT GREAT SAPHENOUS MIDDLE THIGH DIA: 0.59 CM
RIGHT GREAT SAPHENOUS MIDDLE THIGH REFLUX: 4000 MS
RIGHT GREAT SAPHENOUS PROXIMAL CALF DIA: 0.27 CM
RIGHT GREAT SAPHENOUS PROXIMAL CALF REFLUX: 0 MS
RIGHT SMALL SAPHENOUS SPJ DIA: 0.35 CM
RIGHT SMALL SAPHENOUS SPJ REFLUX: 0 MS

## 2023-12-12 PROCEDURE — 93971 EXTREMITY STUDY: CPT | Mod: TC,RT

## 2023-12-18 ENCOUNTER — TELEPHONE (OUTPATIENT)
Dept: INTERNAL MEDICINE | Facility: CLINIC | Age: 63
End: 2023-12-18
Payer: MEDICAID

## 2023-12-18 NOTE — TELEPHONE ENCOUNTER
----- Message from CECIL Samaniego sent at 12/17/2023  2:47 PM CST -----  Referral was placed to Dr. Dewayne Rosario on 10/26/2023.

## 2023-12-19 ENCOUNTER — TELEPHONE (OUTPATIENT)
Dept: INTERNAL MEDICINE | Facility: CLINIC | Age: 63
End: 2023-12-19
Payer: MEDICAID

## 2024-01-22 PROBLEM — Z00.00 WELL ADULT EXAM: Status: RESOLVED | Noted: 2023-10-23 | Resolved: 2024-01-22

## 2024-11-20 DIAGNOSIS — I10 PRIMARY HYPERTENSION: ICD-10-CM

## 2024-11-20 RX ORDER — AMLODIPINE BESYLATE 10 MG/1
10 TABLET ORAL DAILY
Qty: 90 TABLET | Refills: 0 | Status: SHIPPED | OUTPATIENT
Start: 2024-11-20 | End: 2025-02-18

## 2024-11-20 NOTE — TELEPHONE ENCOUNTER
Pt requesting refills be sent to [facility] [place], [place] - [address]. [[id number]]         LOV:11/13/23    NOV: 1/29/25

## 2024-11-20 NOTE — TELEPHONE ENCOUNTER
----- Message from Elena sent at 11/20/2024  9:48 AM CST -----  Regarding: Refill  Provider: Alta Barriga  [facility]: [address]      Last Visit:  Next Visit: 1/29/2024  Patient's Contact Number:    1. Name of Medication: amLODIPine (NORVASC) 10 MG tablet       Dosage:  Comments:    2. Name of Medication:  Dosage:  Comments:    3. Name of Medication:  Dosage:  Comments    4. Name of Medication:  Dosage:  Comments:    5. Name of Medication:  Dosage:  Comments:

## 2024-12-26 ENCOUNTER — HOSPITAL ENCOUNTER (EMERGENCY)
Facility: HOSPITAL | Age: 64
Discharge: HOME | End: 2024-12-26
Attending: INTERNAL MEDICINE
Payer: MEDICARE

## 2024-12-26 VITALS
DIASTOLIC BLOOD PRESSURE: 63 MMHG | SYSTOLIC BLOOD PRESSURE: 147 MMHG | WEIGHT: 140.63 LBS | TEMPERATURE: 98 F | HEIGHT: 66 IN | HEART RATE: 70 BPM | BODY MASS INDEX: 22.6 KG/M2 | RESPIRATION RATE: 16 BRPM | OXYGEN SATURATION: 98 %

## 2024-12-26 DIAGNOSIS — I10 PRIMARY HYPERTENSION: ICD-10-CM

## 2024-12-26 DIAGNOSIS — I10 UNCONTROLLED HYPERTENSION: ICD-10-CM

## 2024-12-26 DIAGNOSIS — K21.00 GASTROESOPHAGEAL REFLUX DISEASE WITH ESOPHAGITIS WITHOUT HEMORRHAGE: ICD-10-CM

## 2024-12-26 DIAGNOSIS — Z76.0 MEDICATION REFILL: Primary | ICD-10-CM

## 2024-12-26 PROCEDURE — 99284 EMERGENCY DEPT VISIT MOD MDM: CPT

## 2024-12-26 RX ORDER — OMEPRAZOLE 40 MG/1
40 CAPSULE, DELAYED RELEASE ORAL
Qty: 60 CAPSULE | Refills: 3 | Status: SHIPPED | OUTPATIENT
Start: 2024-12-26

## 2024-12-26 RX ORDER — AMLODIPINE BESYLATE 10 MG/1
10 TABLET ORAL DAILY
Qty: 90 TABLET | Refills: 1 | Status: SHIPPED | OUTPATIENT
Start: 2024-12-26 | End: 2025-06-24

## 2024-12-26 RX ORDER — OMEPRAZOLE 40 MG/1
40 CAPSULE, DELAYED RELEASE ORAL
Qty: 60 CAPSULE | Refills: 3 | Status: SHIPPED | OUTPATIENT
Start: 2024-12-26 | End: 2024-12-26

## 2024-12-26 NOTE — ED PROVIDER NOTES
Encounter Date: 12/26/2024       History     Chief Complaint   Patient presents with    Medication Refill     Needs acid reflux and blood pressure meds refilled , no distress currently      Presents requesting medication refill on her Amlodipine and Omeprazole. States Hx of HTN and GERD. Having some heartburn and nausea. States R/O medications about 3 weeks ago    The history is provided by the patient.     Review of patient's allergies indicates:   Allergen Reactions    Codeine Itching     Other reaction(s): itching, vomiting  Other reaction(s): itching, vomiting, Not Indicated  Other reaction(s): itching, vomiting     Past Medical History:   Diagnosis Date    Anxiety disorder, unspecified     Anxiety with depression     Gastric ulcer     GERD (gastroesophageal reflux disease)     History of Troy-Parkinson-White (WPW) syndrome     HTN (hypertension)     Irregular heart beat     Lumbar radiculopathy     Major psychotic depression, recurrent     Sciatica     Unspecified viral hepatitis C without hepatic coma     Vitamin D deficiency      Past Surgical History:   Procedure Laterality Date    COLONOSCOPY      TONSILLECTOMY AND ADENOIDECTOMY       Family History   Problem Relation Name Age of Onset    Hypertension Mother      Diabetes Mother      Epilepsy Father      Diabetes Sister      Anemia Brother       Social History     Tobacco Use    Smoking status: Former    Smokeless tobacco: Never   Substance Use Topics    Alcohol use: Yes    Drug use: Yes     Types: Marijuana     Review of Systems   Gastrointestinal:  Positive for abdominal pain and nausea.       Physical Exam     Initial Vitals [12/26/24 1051]   BP Pulse Resp Temp SpO2   (!) 147/63 68 18 98.1 °F (36.7 °C) 99 %      MAP       --         Physical Exam    Nursing note and vitals reviewed.  Constitutional: She appears well-developed. No distress.   HENT:   Head: Normocephalic and atraumatic. Mouth/Throat: Oropharynx is clear and moist.   Eyes: Conjunctivae are  normal. Pupils are equal, round, and reactive to light.   Neck: Neck supple.   Normal range of motion.  Cardiovascular:  Normal rate, regular rhythm, normal heart sounds and intact distal pulses.           Pulmonary/Chest: Breath sounds normal.   Abdominal: Abdomen is soft. Bowel sounds are normal. She exhibits no distension. There is no abdominal tenderness. There is no rebound and no guarding.   Musculoskeletal:         General: No edema. Normal range of motion.      Cervical back: Normal range of motion and neck supple.     Neurological: She is alert and oriented to person, place, and time. She has normal strength. GCS score is 15. GCS eye subscore is 4. GCS verbal subscore is 5. GCS motor subscore is 6.   Skin: Skin is warm and dry. No rash noted.   Psychiatric: Her behavior is normal. Thought content normal.         ED Course   Procedures  Labs Reviewed - No data to display  EKG Readings: (Independently Interpreted)   Initial Reading: No STEMI. Rhythm: Normal Sinus Rhythm. Heart Rate: 66. Ectopy: No Ectopy. Conduction: Normal. ST Segments: Normal ST Segments. T Waves: Normal. Axis: Normal. Clinical Impression: Normal Sinus Rhythm       Imaging Results    None          Medications - No data to display  Medical Decision Making  Amount and/or Complexity of Data Reviewed  ECG/medicine tests: ordered and independent interpretation performed. Decision-making details documented in ED Course.                                      Clinical Impression:  Final diagnoses:  [Z76.0] Medication refill (Primary)  [I10] Uncontrolled hypertension          ED Disposition Condition    Discharge Stable          ED Prescriptions       Medication Sig Dispense Start Date End Date Auth. Provider    amLODIPine (NORVASC) 10 MG tablet Take 1 tablet (10 mg total) by mouth once daily. 90 tablet 12/26/2024 6/24/2025 Zach Chin MD    omeprazole (PRILOSEC) 40 MG capsule  (Status: Discontinued) Take 1 capsule (40 mg total) by  mouth 2 (two) times daily before meals. 60 capsule 12/26/2024 12/26/2024 Zach Chin MD    omeprazole (PRILOSEC) 40 MG capsule Take 1 capsule (40 mg total) by mouth 2 (two) times daily before meals. 60 capsule 12/26/2024 -- Zach Chin MD          Follow-up Information       Follow up With Specialties Details Why Contact Info    Alta Barriga, P Family Medicine Schedule an appointment as soon as possible for a visit in 2 months  [address] [facility] [address]  [phone]      [facility] Emergency Medicine  If symptoms worsen [address] [facility] [id number] [phone]             Zach Chin MD  12/26/24 1100

## 2024-12-27 LAB
OHS QRS DURATION: 76 MS
OHS QTC CALCULATION: 434 MS

## 2025-03-13 ENCOUNTER — HOSPITAL ENCOUNTER (EMERGENCY)
Facility: HOSPITAL | Age: 65
Discharge: HOME | End: 2025-03-13
Payer: MEDICARE

## 2025-03-13 VITALS
WEIGHT: 148 LBS | HEART RATE: 87 BPM | DIASTOLIC BLOOD PRESSURE: 94 MMHG | TEMPERATURE: 98 F | OXYGEN SATURATION: 98 % | SYSTOLIC BLOOD PRESSURE: 169 MMHG | BODY MASS INDEX: 23.89 KG/M2 | RESPIRATION RATE: 28 BRPM
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
## 2025-03-13 DIAGNOSIS — M79.606 LEG PAIN: ICD-10-CM

## 2025-03-13 DIAGNOSIS — M25.539 WRIST PAIN: ICD-10-CM

## 2025-03-13 PROCEDURE — 90715 TDAP VACCINE 7 YRS/> IM: CPT

## 2025-03-13 PROCEDURE — 96372 THER/PROPH/DIAG INJ SC/IM: CPT

## 2025-03-13 PROCEDURE — 90471 IMMUNIZATION ADMIN: CPT

## 2025-03-13 PROCEDURE — 12032 INTMD RPR S/A/T/EXT 2.6-7.5: CPT

## 2025-03-13 PROCEDURE — 63600175 PHARM REV CODE 636 W HCPCS

## 2025-03-13 PROCEDURE — 25000003 PHARM REV CODE 250

## 2025-03-13 PROCEDURE — 99284 EMERGENCY DEPT VISIT MOD MDM: CPT | Mod: 25

## 2025-03-13 RX ORDER — LIDOCAINE HYDROCHLORIDE 10 MG/ML
10 INJECTION, SOLUTION INFILTRATION; PERINEURAL
Status: COMPLETED | OUTPATIENT
Start: 2025-03-13 | End: 2025-03-13

## 2025-03-13 RX ORDER — AMOXICILLIN AND CLAVULANATE POTASSIUM 875; 125 MG/1; MG/1
1 TABLET, FILM COATED ORAL
Status: COMPLETED | OUTPATIENT
Start: 2025-03-13 | End: 2025-03-13

## 2025-03-13 RX ORDER — ACETAMINOPHEN 500 MG
1000 TABLET ORAL
Status: COMPLETED | OUTPATIENT
Start: 2025-03-13 | End: 2025-03-13

## 2025-03-13 RX ORDER — AMOXICILLIN AND CLAVULANATE POTASSIUM 875; 125 MG/1; MG/1
1 TABLET, FILM COATED ORAL 2 TIMES DAILY
Qty: 14 TABLET | Refills: 0 | Status: SHIPPED | OUTPATIENT
Start: 2025-03-13

## 2025-03-13 RX ORDER — LIDOCAINE HYDROCHLORIDE 20 MG/ML
10 INJECTION, SOLUTION INFILTRATION; PERINEURAL
Status: COMPLETED | OUTPATIENT
Start: 2025-03-13 | End: 2025-03-13

## 2025-03-13 RX ORDER — METHOCARBAMOL 500 MG/1
1000 TABLET, FILM COATED ORAL
Status: COMPLETED | OUTPATIENT
Start: 2025-03-13 | End: 2025-03-13

## 2025-03-13 RX ORDER — BACITRACIN 500 [USP'U]/G
OINTMENT TOPICAL
Status: COMPLETED | OUTPATIENT
Start: 2025-03-13 | End: 2025-03-13

## 2025-03-13 RX ORDER — KETOROLAC TROMETHAMINE 30 MG/ML
15 INJECTION, SOLUTION INTRAMUSCULAR; INTRAVENOUS
Status: COMPLETED | OUTPATIENT
Start: 2025-03-13 | End: 2025-03-13

## 2025-03-13 RX ADMIN — LIDOCAINE HYDROCHLORIDE 10 ML: 20 INJECTION, SOLUTION INFILTRATION; PERINEURAL at 06:03

## 2025-03-13 RX ADMIN — LIDOCAINE HYDROCHLORIDE 10 ML: 10 INJECTION, SOLUTION INFILTRATION; PERINEURAL at 06:03

## 2025-03-13 RX ADMIN — METHOCARBAMOL 1000 MG: 500 TABLET ORAL at 05:03

## 2025-03-13 RX ADMIN — KETOROLAC TROMETHAMINE 15 MG: 30 INJECTION, SOLUTION INTRAMUSCULAR at 05:03

## 2025-03-13 RX ADMIN — BACITRACIN 1 G: 500 OINTMENT TOPICAL at 05:03

## 2025-03-13 RX ADMIN — AMOXICILLIN AND CLAVULANATE POTASSIUM 1 TABLET: 875; 125 TABLET ORAL at 06:03

## 2025-03-13 RX ADMIN — CLOSTRIDIUM TETANI TOXOID ANTIGEN (FORMALDEHYDE INACTIVATED), CORYNEBACTERIUM DIPHTHERIAE TOXOID ANTIGEN (FORMALDEHYDE INACTIVATED), BORDETELLA PERTUSSIS TOXOID ANTIGEN (GLUTARALDEHYDE INACTIVATED), BORDETELLA PERTUSSIS FILAMENTOUS HEMAGGLUTININ ANTIGEN (FORMALDEHYDE INACTIVATED), BORDETELLA PERTUSSIS PERTACTIN ANTIGEN, AND BORDETELLA PERTUSSIS FIMBRIAE 2/3 ANTIGEN 0.5 ML: 5; 2; 2.5; 5; 3; 5 INJECTION, SUSPENSION INTRAMUSCULAR at 05:03

## 2025-03-13 RX ADMIN — ACETAMINOPHEN 1000 MG: 500 TABLET ORAL at 05:03

## 2025-03-13 NOTE — DISCHARGE INSTRUCTIONS
You presented to the ER today due to multiple dog bites.    Please get your stitches removed in 7-10 days.  I have written you antibiotic-please take as prescribed.    You can take Tylenol/Motrin for pain control as needed.  Your tetanus shot has been updated.    Please follow up with wound clinic as discussed today.  Follow up with your primary care doctor.  If your symptoms worsen or you feel unwell please return to the ED.   Rifampin Pregnancy And Lactation Text: This medication is Pregnancy Category C and it isn't know if it is safe during pregnancy. It is also excreted in breast milk and should not be used if you are breast feeding.

## 2025-03-13 NOTE — ED NOTES
Pt presents via EMS c/o dog bites & nausea; Pt states her dog got run over by a vehicle & when she tried to grab the dog, it bit her. Pt has bruising and abrasions on bilateral wrists & on RLE; Sites free of redness & swelling; Abrasions clotting appropriately; Sites cleaned & dry sterile dressing in place. Pt states she takes aspirin daily & her last TDAP was around 10 years ago.

## 2025-03-13 NOTE — ED NOTES
Laceration repair complete; Pt tolerated sutures well; Dry dressing placed. Pt educated about S/S on infection, wound care, & medications.

## 2025-03-13 NOTE — ED NOTES
Called wound care @ [facility] to schedule a follow-up for pt R-pinky finger evulsion; Wound care clinic closed but left a message. Pt's phone #: [phone].

## 2025-03-13 NOTE — ED PROVIDER NOTES
Encounter Date: 3/13/2025       History     Chief Complaint   Patient presents with    Animal Bite     Pt's dog was hit by a car/ when she picked  up the dog the dog fought her then / pt has rt leg abrasion and puncture bite/  left wrist with open laceration/right wrist with closed laceration rt little finger has a nail avulsion     64-year-old female with past medical history of hypertension, acid reflux, Troy-Parkinson-White syndrome, anxiety, depression presents to the ED due to dog bite.  Patient states she was taking care of her mother's dog when it ran out into the street.  States that she chased after the dog however the dog got ran over by a vehicle.  States that when she went to go  the dog a thought her before dying.  States that she got scratched to bilateral wrists and bilateral tib fibs.  Patient states that when she was running she did injure bilateral shins.  Denies any head injury or LOC.  Does admit to alcohol use today.  Denies fever, chills, chest pain, shortness of breath, abdominal pain, nausea, vomiting, diarrhea.  Patient states her tetanus was updated about 10 years ago.  Patient states her mother's dogs recently had their vaccinations updated at the .        Review of patient's allergies indicates:   Allergen Reactions    Codeine Itching     Other reaction(s): itching, vomiting  Other reaction(s): itching, vomiting, Not Indicated  Other reaction(s): itching, vomiting     Past Medical History:   Diagnosis Date    Anxiety disorder, unspecified     Anxiety with depression     Gastric ulcer     GERD (gastroesophageal reflux disease)     History of Troy-Parkinson-White (WPW) syndrome     HTN (hypertension)     Irregular heart beat     Lumbar radiculopathy     Major psychotic depression, recurrent     Sciatica     Unspecified viral hepatitis C without hepatic coma     Vitamin D deficiency      Past Surgical History:   Procedure Laterality Date    COLONOSCOPY       TONSILLECTOMY AND ADENOIDECTOMY       Family History   Problem Relation Name Age of Onset    Hypertension Mother      Diabetes Mother      Epilepsy Father      Diabetes Sister      Anemia Brother       Social History[1]  Review of Systems   Constitutional:  Negative for chills and fever.   Respiratory:  Negative for shortness of breath.    Cardiovascular:  Negative for chest pain.   Gastrointestinal:  Negative for abdominal pain, diarrhea, nausea and vomiting.   Neurological:  Negative for dizziness and headaches.       Physical Exam     Initial Vitals [03/13/25 1646]   BP Pulse Resp Temp SpO2   (!) 160/96 87 (!) 28 98 °F (36.7 °C) 98 %      MAP       --         Physical Exam    Nursing note and vitals reviewed.  Constitutional: She appears well-developed. She is not diaphoretic.   HENT:   Head: Normocephalic and atraumatic.   Eyes: EOM are normal. Pupils are equal, round, and reactive to light.   Neck: Neck supple.   Normal range of motion.  Cardiovascular:  Normal rate, regular rhythm, normal heart sounds and intact distal pulses.           Bilateral radial and DP pulses intact and equal.   Pulmonary/Chest: Breath sounds normal. No respiratory distress. She has no wheezes.   Abdominal: Abdomen is soft. There is no abdominal tenderness.   Musculoskeletal:      Cervical back: Normal range of motion and neck supple.      Comments: Left wrist dorsal aspect: 2.5 cm laceration - no tendon involvement. 0.5 cm laceration.   No numbness, tingling or weakness present to the left wrist, palm or fingers. Motor intact to all fingers. Good opposition of the left thumb.    Right wrist ventral aspect: 1 cm laceration - no tendon involvement. 0.5 cm laceration.   No numbness, tingling or weakness present to the right wrist, palm or fingers. Motor intact to all fingers. Good opposition of the right thumb.    Right pinky:  Avulsion of the skin of the tip of the pinky, part of the nail missing.  Remaining nail is still intact.  No  nail bed involvement.      Left tib-fib:  Bruising and tenderness present.    Right tib-fib:  Bruising and tenderness present.  Small skin tear over the anterior shin.     Neurological: She is alert and oriented to person, place, and time. She has normal strength. GCS score is 15. GCS eye subscore is 4. GCS verbal subscore is 5. GCS motor subscore is 6.                     ED Course   Lac Repair    Date/Time: 3/14/2025 8:00 AM    Performed by: Mago Talley DO  Authorized by: Mago Talley DO    Consent:     Consent obtained:  Verbal    Consent given by:  Patient    Risks discussed:  Pain, infection and retained foreign body  Universal protocol:     Patient identity confirmed:  Verbally with patient and arm band  Anesthesia:     Anesthesia method:  Local infiltration    Local anesthetic:  Lidocaine 1% WITH epi  Laceration details:     Location: Left wrist.    Wound length (cm): 2-1/2 cm and 0.5 cm.  Pre-procedure details:     Preparation:  Patient was prepped and draped in usual sterile fashion and imaging obtained to evaluate for foreign bodies  Exploration:     Imaging obtained: x-ray      Wound extent: no foreign bodies/material noted, no muscle damage noted, no nerve damage noted, no tendon damage noted and no underlying fracture noted    Treatment:     Area cleansed with:  Saline    Amount of cleaning:  Extensive    Irrigation solution:  Sterile saline    Irrigation method:  Pressure wash    Debridement:  None  Skin repair:     Repair method:  Sutures    Suture size:  4-0    Suture material:  Prolene    Suture technique:  Simple interrupted    Number of sutures:  4  Approximation:     Approximation:  Loose  Repair type:     Repair type:  Simple  Post-procedure details:     Dressing:  Antibiotic ointment    Procedure completion:  Tolerated  Lac Repair    Date/Time: 3/14/2025 8:02 AM    Performed by: Mago Talley DO  Authorized by: Mago Talley DO    Consent:     Consent obtained:   Verbal    Consent given by:  Patient    Risks discussed:  Pain, infection and retained foreign body  Universal protocol:     Patient identity confirmed:  Verbally with patient and arm band  Anesthesia:     Anesthesia method:  Local infiltration    Local anesthetic:  Lidocaine 1% WITH epi  Laceration details:     Location: Right wrist.    Wound length (cm): 1 cm and 0.5 cm.  Pre-procedure details:     Preparation:  Patient was prepped and draped in usual sterile fashion and imaging obtained to evaluate for foreign bodies  Exploration:     Imaging obtained: x-ray      Wound extent: no foreign bodies/material noted, no muscle damage noted, no nerve damage noted, no tendon damage noted and no underlying fracture noted    Treatment:     Area cleansed with:  Saline    Amount of cleaning:  Extensive    Irrigation solution:  Sterile saline    Irrigation method:  Pressure wash    Debridement:  None  Skin repair:     Repair method:  Sutures    Suture size:  4-0    Suture material:  Prolene    Suture technique:  Simple interrupted    Number of sutures:  3  Approximation:     Approximation:  Loose  Repair type:     Repair type:  Simple  Post-procedure details:     Dressing:  Antibiotic ointment    Procedure completion:  Tolerated    Labs Reviewed - No data to display       Imaging Results              X-Ray Wrist Complete Left (Final result)  Result time 03/13/25 17:59:06      Final result by Alon Gibosn MD (03/13/25 17:59:06)                   Impression:      Changes suggestive of soft tissue laceration on the palmar aspect of the wrist.    Radiopaque densities seen in 1 of the projections just superior to the other styloid hard to ascertain whether is internal external to the patient      Electronically signed by: Alon Gibson  Date:    03/13/2025  Time:    17:59               Narrative:    EXAMINATION:  XR WRIST COMPLETE 3 VIEWS LEFT    CLINICAL HISTORY:  Pain in unspecified  wrist    COMPARISON:  None.    FINDINGS:  No acute displaced fractures or dislocations.    Joint spaces preserved.    No blastic or lytic lesions.    There might be a soft tissue laceration in the palm are aspect of the wrist    Small radiopaque density projects just superior to the ulnar styloid hard to ascertain whether it is internal external to the patient                                       X-Ray Tibia Fibula 2 View Right (Final result)  Result time 03/13/25 17:56:36      Final result by Alon Gibson MD (03/13/25 17:56:36)                   Impression:      No acute osseous abnormality.    .    Soft tissue densities might reflect the presence of radiopaque foreign bodies clinical correlation is suggested      Electronically signed by: Alon iGbson  Date:    03/13/2025  Time:    17:56               Narrative:    EXAMINATION:  XR TIBIA FIBULA 2 VIEW RIGHT    CLINICAL HISTORY:  Pain in leg, unspecified    COMPARISON:  None.    FINDINGS:  No acute displaced fractures or dislocations.    Joint spaces preserved.    No blastic or lytic lesions.    Some radiopaque densities identified at the posterior aspect of the calf and anteriorly which might be related to radiopaque foreign bodies clinical correlation is suggested                                       X-Ray Wrist Complete Right (Final result)  Result time 03/13/25 17:59:36      Final result by Alon Gibson MD (03/13/25 17:59:36)                   Impression:      No acute osseous abnormality.      Electronically signed by: Alon Gibson  Date:    03/13/2025  Time:    17:59               Narrative:    EXAMINATION:  XR WRIST COMPLETE 3 VIEWS RIGHT    CLINICAL HISTORY:  Pain in unspecified wrist    COMPARISON:  None.    FINDINGS:  No acute displaced fractures or dislocations.    Joint spaces preserved.    No blastic or lytic lesions.    Soft tissues within normal limits.                                       X-Ray Hand 3 View Right (Final  result)  Result time 03/13/25 18:07:56      Final result by Alon Gibson MD (03/13/25 18:07:56)                   Impression:      Soft tissue amputation at the distal aspect of the 5th digit.    Degenerative changes.      Electronically signed by: Alon Gibson  Date:    03/13/2025  Time:    18:07               Narrative:    EXAMINATION:  XR HAND COMPLETE 3 VIEW RIGHT    CLINICAL HISTORY:  hand pain;    COMPARISON:  None.    FINDINGS:  No acute displaced fractures or dislocations.    There are some degenerative changes of the interphalangeal joints with degenerative changes of the 1st carpometacarpal joint    No blastic or lytic lesions.    Soft tissue amputation at the distal aspect of the 5th digit                                       X-Ray Tibia Fibula 2 View Left (Final result)  Result time 03/13/25 17:55:04      Final result by Alon Gibson MD (03/13/25 17:55:04)                   Impression:      No acute osseous abnormality.      Electronically signed by: Alon Gibson  Date:    03/13/2025  Time:    17:55               Narrative:    EXAMINATION:  XR TIBIA FIBULA 2 VIEW LEFT    CLINICAL HISTORY:  Pain in leg, unspecified    COMPARISON:  None.    FINDINGS:  No acute displaced fractures or dislocations.    Joint spaces preserved.    No blastic or lytic lesions.    Soft tissues within normal limits.                                       Medications   acetaminophen tablet 1,000 mg (1,000 mg Oral Given 3/13/25 1722)   ketorolac injection 15 mg (15 mg Intramuscular Given 3/13/25 1724)   LIDOcaine HCL 10 mg/ml (1%) injection 10 mL (10 mLs Infiltration Given 3/13/25 1805)   bacitracin ointment (1 g Topical (Top) Given 3/13/25 1715)   LIDOcaine HCL 20 mg/ml (2%) injection 10 mL (10 mLs Infiltration Given 3/13/25 1805)   Tdap vaccine injection 0.5 mL (0.5 mLs Intramuscular Given 3/13/25 1723)   methocarbamoL tablet 1,000 mg (1,000 mg Oral Given 3/13/25 1723)   amoxicillin-clavulanate 875-125mg  per tablet 1 tablet (1 tablet Oral Given 3/13/25 1814)     Medical Decision Making  See ED course for MDM.     Amount and/or Complexity of Data Reviewed  Radiology: ordered.    Risk  OTC drugs.  Prescription drug management.               ED Course as of 03/14/25 0802   u Mar 13, 2025   1715 64-year-old female with past medical history of hypertension, acid reflux, Troy-Parkinson-White syndrome, anxiety, depression presents to the ED due to dog bite.  Patient states she was taking care of her mother's dog when it ran out into the street.  States that she chased after the dog however the dog got ran over by a vehicle.  States that when she went to go  the dog a thought her before dying.  States that she got scratched to bilateral wrists and bilateral tib fibs.  Patient states that when she was running she did injure bilateral shins.  Denies any head injury or LOC.  Does admit to alcohol use today.  Denies fever, chills, chest pain, shortness of breath, abdominal pain, nausea, vomiting, diarrhea.  Patient states her tetanus was updated about 10 years ago.  Patient states her mother's dogs recently had their vaccinations updated at the .    On examination patient is awake and alert.  Vital signs are stable.  Heart is regular rate and rhythm.  Lungs are clear.  Abdomen is soft nontender.     Left wrist dorsal aspect: 2.5 cm laceration - no tendon involvement. 0.5 cm laceration.   No numbness, tingling or weakness present to the left wrist, palm or fingers. Motor intact to all fingers. Good opposition of the left thumb.    Right wrist ventral aspect: 1 cm laceration - no tendon involvement. 0.5 cm laceration.   No numbness, tingling or weakness present to the right wrist, palm or fingers. Motor intact to all fingers. Good opposition of the right thumb.    Right pinky:  Avulsion of the skin of the tip of the pinky, part of the nail missing.  Remaining nail is still intact.  No nail bed involvement.       Left tib-fib:  Bruising and tenderness present.    Right tib-fib:  Bruising and tenderness present.  Small skin tear over the anterior shin.    Differential diagnosis consists of but not limited to lacerations, abrasions, dog bite, fractures. [NP]   1810 Right hand x-ray shows soft tissue amputation of the right pinky finger, no fractures present.  Irrigated and Bactroban applied.  Wet to dry dressing done.  Will have patient follow up with wound clinic regarding this wound.    Right wrist x-ray does not show any signs of foreign body or fractures present.    Left wrist x-ray does not show any signs of fractures present however there was a concern for foreign body.  Patient's wounds were thoroughly irrigated-did not feel or see any foreign body in that area that was mentioned on the x-ray read.    Right tib-fib findings concerning for possible foreign body present.  Patient did have a small skin tear present there however it was irrigated copiously and no foreign body was seen or felt.    Left tib-fib x-ray: No acute fractures or abnormalities present. [NP]   1810 Patient's wounds were extensively irrigated and lacerations were repaired.  Bactroban was placed over all the wounds and lacerations and appropriately bandaged.    Patient received a dose of amoxicillin here in the ED.  Will send amoxicillin prescription to her pharmacy.  Patient also informed to take Tylenol/Motrin as needed for pain control.    Patient had multiple stitches placed-informed to get them removed in 7-10 days.  Patient given contact information for wound clinic regarding her right pinky.    Patient understands and agrees with treatment plan and discharge.  Patient informed to follow up with her primary care doctor.  Patient informed that if her wounds become warm, erythematous or have drainage to return to the ED. [NP]      ED Course User Index  [NP] Mago Talley DO                           Clinical Impression:  Final  diagnoses:  [M25.539] Wrist pain  [M79.606] Leg pain  [W54.0XXA] Dog bite, initial encounter (Primary)          ED Disposition Condition    Discharge Stable          ED Prescriptions       Medication Sig Dispense Start Date End Date Auth. Provider    amoxicillin-clavulanate 875-125mg (AUGMENTIN) 875-125 mg per tablet Take 1 tablet by mouth 2 (two) times daily. 14 tablet 3/13/2025 -- Mago Talley DO          Follow-up Information       Follow up With Specialties Details Why Contact Info    Alta Barriga, FNP Family Medicine Schedule an appointment as soon as possible for a visit in 1 week  [address] [id number] [phone]                 [1]   Social History  Tobacco Use    Smoking status: Former    Smokeless tobacco: Never   Substance Use Topics    Alcohol use: Yes    Drug use: Yes     Types: Marijuana        Mago Talley DO  03/14/25 0802

## 2025-03-13 NOTE — ED NOTES
[facility] called asking for pt's # to contact her for a report but pt refusing to give it; Charge nurse aware.

## 2025-04-02 ENCOUNTER — HOSPITAL ENCOUNTER (EMERGENCY)
Facility: HOSPITAL | Age: 65
Discharge: HOME | End: 2025-04-02
Attending: FAMILY MEDICINE
Payer: MEDICARE

## 2025-04-02 VITALS
TEMPERATURE: 98 F | BODY MASS INDEX: 21.69 KG/M2 | HEIGHT: 66 IN | HEART RATE: 80 BPM | OXYGEN SATURATION: 98 % | RESPIRATION RATE: 18 BRPM | SYSTOLIC BLOOD PRESSURE: 172 MMHG | WEIGHT: 135 LBS | DIASTOLIC BLOOD PRESSURE: 82 MMHG

## 2025-04-02 DIAGNOSIS — T14.8XXA WOUND INFECTION: ICD-10-CM

## 2025-04-02 DIAGNOSIS — L08.9 WOUND INFECTION: ICD-10-CM

## 2025-04-02 DIAGNOSIS — Z48.02 VISIT FOR SUTURE REMOVAL: Primary | ICD-10-CM

## 2025-04-02 PROCEDURE — 99999 HC NO LEVEL OF SERVICE - ED ONLY: CPT

## 2025-04-02 RX ORDER — ONDANSETRON 4 MG/1
4 TABLET, ORALLY DISINTEGRATING ORAL EVERY 6 HOURS PRN
Qty: 12 TABLET | Refills: 0 | Status: SHIPPED | OUTPATIENT
Start: 2025-04-02 | End: 2025-04-04

## 2025-04-02 RX ORDER — AMOXICILLIN AND CLAVULANATE POTASSIUM 875; 125 MG/1; MG/1
1 TABLET, FILM COATED ORAL 2 TIMES DAILY
Qty: 14 TABLET | Refills: 0 | Status: SHIPPED | OUTPATIENT
Start: 2025-04-02

## 2025-04-02 NOTE — ED PROVIDER NOTES
Encounter Date: 4/2/2025       History     Chief Complaint   Patient presents with    Suture / Staple Removal     Pt here to remove sutures on bilateral wrist . Sutures been in approximately three weeks redness around wound pt reports pus and green drainage     64-year-old presents for suture removal has been in over 3 weeks said she has some dog bites she has noticed some drainage from the wounds on exam I see no drainage has some mild irritation erythema around it not sure if just come suture reaction for min there to longer some early infection no fevers no chills no pus pockets we will go ahead and remove the sutures today put her on some antibiotics have him follow up with the doctor in a week just to get him rechecked        Review of patient's allergies indicates:   Allergen Reactions    Codeine Itching     Other reaction(s): itching, vomiting  Other reaction(s): itching, vomiting, Not Indicated  Other reaction(s): itching, vomiting     Past Medical History:   Diagnosis Date    Anxiety disorder, unspecified     Anxiety with depression     Gastric ulcer     GERD (gastroesophageal reflux disease)     History of Troy-Parkinson-White (WPW) syndrome     HTN (hypertension)     Irregular heart beat     Lumbar radiculopathy     Major psychotic depression, recurrent     Sciatica     Unspecified viral hepatitis C without hepatic coma     Vitamin D deficiency      Past Surgical History:   Procedure Laterality Date    COLONOSCOPY      TONSILLECTOMY AND ADENOIDECTOMY       Family History   Problem Relation Name Age of Onset    Hypertension Mother      Diabetes Mother      Epilepsy Father      Diabetes Sister      Anemia Brother       Social History[1]  Review of Systems   Skin:  Positive for wound.   All other systems reviewed and are negative.      Physical Exam     Initial Vitals [04/02/25 0943]   BP Pulse Resp Temp SpO2   (!) 172/82 80 18 98 °F (36.7 °C) 98 %      MAP       --         Physical Exam    Nursing note and  vitals reviewed.  Constitutional: She appears well-developed and well-nourished. She is active.   HENT:   Head: Normocephalic and atraumatic.   Eyes: Conjunctivae, EOM and lids are normal.   Neck: Trachea normal and phonation normal. Neck supple. No thyroid mass present.   Normal range of motion.  Cardiovascular:  Normal rate, regular rhythm, normal heart sounds and normal pulses.           Musculoskeletal:      Cervical back: Normal range of motion and neck supple.     Neurological: She is alert and oriented to person, place, and time.   Skin: Skin is warm and intact.   Wounds on wrist with sutures in him some erythema no drainage no fluctuance   Psychiatric: She has a normal mood and affect. Her speech is normal and behavior is normal. Judgment and thought content normal. Cognition and memory are normal.         ED Course   Procedures  Labs Reviewed - No data to display       Imaging Results    None          Medications - No data to display  Medical Decision Making  64-year-old presents for suture removal has been in over 3 weeks said she has some dog bites she has noticed some drainage from the wounds on exam I see no drainage has some mild irritation erythema around it not sure if just come suture reaction for min there to longer some early infection no fevers no chills no pus pockets we will go ahead and remove the sutures today put her on some antibiotics have him follow up with the doctor in a week just to get him rechecked          Risk  Prescription drug management.  Risk Details: Differential diagnosis wound infection versus suture removal visit                                      Clinical Impression:  Final diagnoses:  [Z48.02] Visit for suture removal (Primary)  [T14.8XXA, L08.9] Wound infection          ED Disposition Condition    Discharge Stable          ED Prescriptions       Medication Sig Dispense Start Date End Date Auth. Provider    amoxicillin-clavulanate 875-125mg (AUGMENTIN) 875-125 mg per  tablet Take 1 tablet by mouth 2 (two) times daily. 14 tablet 4/2/2025 -- Rolo Baird MD          Follow-up Information       Follow up With Specialties Details Why Contact Info    Alta Barriga, [facility] In 2 days  [address] [facility] [address]  [phone]                 [1]   Social History  Tobacco Use    Smoking status: Former    Smokeless tobacco: Never   Substance Use Topics    Alcohol use: Yes    Drug use: Yes     Types: Marijuana        Rolo Baird MD  04/02/25 1007

## 2025-04-14 ENCOUNTER — HOSPITAL ENCOUNTER (EMERGENCY)
Facility: HOSPITAL | Age: 65
Discharge: HOME | End: 2025-04-14
Attending: EMERGENCY MEDICINE
Payer: COMMERCIAL

## 2025-04-14 VITALS
OXYGEN SATURATION: 99 % | HEART RATE: 75 BPM | SYSTOLIC BLOOD PRESSURE: 137 MMHG | WEIGHT: 135 LBS | TEMPERATURE: 98 F | BODY MASS INDEX: 21.69 KG/M2 | HEIGHT: 66 IN | DIASTOLIC BLOOD PRESSURE: 77 MMHG | RESPIRATION RATE: 20 BRPM

## 2025-04-14 DIAGNOSIS — L30.9 DERMATITIS: ICD-10-CM

## 2025-04-14 DIAGNOSIS — Z48.02 ENCOUNTER FOR REMOVAL OF SUTURES: Primary | ICD-10-CM

## 2025-04-14 PROCEDURE — 99283 EMERGENCY DEPT VISIT LOW MDM: CPT

## 2025-04-14 RX ORDER — MUPIROCIN 20 MG/G
OINTMENT TOPICAL 3 TIMES DAILY
Qty: 22 G | Refills: 0 | Status: SHIPPED | OUTPATIENT
Start: 2025-04-14

## 2025-04-14 NOTE — DISCHARGE INSTRUCTIONS
Mupirocin ointment to wound where sutures were removed  Use over the counter steroid cream to rash of forearm

## 2025-04-14 NOTE — ED PROVIDER NOTES
Encounter Date: 4/14/2025       History     Chief Complaint   Patient presents with    Rash     Pt reports here recently for dog bite, had stitches put in, came back for removal, but here today to remove stitches again, bilat wrists itching & new rash pt reports worries its infected      64 year old female presents to ER complaining of itching and redness of wounds. She was seen last month for dog bite and had sutures. She returned late to have sutures removed. She states now having itching of wounds and thinks she has retained sutures. No swelling or purulent drainage.     The history is provided by the patient. No  was used.     Review of patient's allergies indicates:   Allergen Reactions    Codeine Itching     Other reaction(s): itching, vomiting  Other reaction(s): itching, vomiting, Not Indicated  Other reaction(s): itching, vomiting     Past Medical History:   Diagnosis Date    Anxiety disorder, unspecified     Anxiety with depression     Gastric ulcer     GERD (gastroesophageal reflux disease)     History of Troy-Parkinson-White (WPW) syndrome     HTN (hypertension)     Irregular heart beat     Lumbar radiculopathy     Major psychotic depression, recurrent     Sciatica     Unspecified viral hepatitis C without hepatic coma     Vitamin D deficiency      Past Surgical History:   Procedure Laterality Date    COLONOSCOPY      TONSILLECTOMY AND ADENOIDECTOMY       Family History   Problem Relation Name Age of Onset    Hypertension Mother      Diabetes Mother      Epilepsy Father      Diabetes Sister      Anemia Brother       Social History[1]  Review of Systems   Constitutional:  Negative for fever.   Gastrointestinal:  Negative for vomiting.   Skin:  Positive for rash and wound. Negative for color change.   All other systems reviewed and are negative.      Physical Exam     Initial Vitals [04/14/25 1449]   BP Pulse Resp Temp SpO2   137/77 75 20 98.3 °F (36.8 °C) 99 %      MAP       --          Physical Exam    Nursing note and vitals reviewed.  Constitutional: She appears well-developed and well-nourished.   HENT:   Head: Normocephalic.   Eyes: EOM are normal.   Neck: Neck supple.   Cardiovascular:  Normal rate, regular rhythm and intact distal pulses.           Pulmonary/Chest: No respiratory distress.   Abdominal: She exhibits no distension.   Musculoskeletal:         General: Normal range of motion.      Cervical back: Neck supple.     Neurological: She is alert and oriented to person, place, and time.   Skin: Skin is warm and dry. Capillary refill takes less than 2 seconds. Rash noted. No erythema.        Psychiatric: She has a normal mood and affect.         ED Course   Suture Removal    Date/Time: 4/14/2025 3:01 PM  Location procedure was performed: [facility] EMERGENCY DEPARTMENT    Performed by: Jessica Romero FNP  Authorized by: Jessica Romero FNP  Body area: upper extremity  Location details: right wrist  Description of findings: 1 suture retained   Wound Appearance: clean, well healed, no drainage and nontender  Sutures Removed: 1  Staples Removed: 0  Post-removal: bandaid applied  Facility: sutures placed in this facility  Complications: No  Specimens: No  Implants: No  Patient tolerance: Patient tolerated the procedure well with no immediate complications      Suture Removal    Date/Time: 4/14/2025 3:02 PM  Location procedure was performed: [facility] EMERGENCY DEPARTMENT    Performed by: Jessica Romero FNP  Authorized by: Jessica Romero FNP  Body area: upper extremity  Location details: left wrist  Description of findings: retained suture   Wound Appearance: clean, nontender, no drainage, normal color and well healed  Sutures Removed: 1  Staples Removed: 0  Facility: sutures placed in this facility  Complications: No  Specimens: No  Implants: No  Patient tolerance: Patient tolerated the procedure well with no immediate complications        Labs Reviewed - No data to display        Imaging Results    None          Medications - No data to display  Medical Decision Making  One suture removed from each wrist. No complications. Will prescribe mupirocin ointment and have patient f/u with PCP.     Risk  Prescription drug management.                                      Clinical Impression:  Final diagnoses:  [Z48.02] Encounter for removal of sutures (Primary)  [L30.9] Dermatitis          ED Disposition Condition    Discharge Stable          ED Prescriptions       Medication Sig Dispense Start Date End Date Auth. Provider    mupirocin (BACTROBAN) 2 % ointment Apply topically 3 (three) times daily. 22 g 4/14/2025 -- Jessica Romero FNP          Follow-up Information    None            [1]   Social History  Tobacco Use    Smoking status: Former    Smokeless tobacco: Never   Substance Use Topics    Alcohol use: Yes    Drug use: Yes     Types: Marijuana        Jessica Romero FNP  04/14/25 1508